# Patient Record
Sex: FEMALE | Race: WHITE | Employment: OTHER | URBAN - METROPOLITAN AREA
[De-identification: names, ages, dates, MRNs, and addresses within clinical notes are randomized per-mention and may not be internally consistent; named-entity substitution may affect disease eponyms.]

---

## 2017-01-01 ENCOUNTER — HOME CARE VISIT (OUTPATIENT)
Dept: HOSPICE | Facility: HOSPICE | Age: 82
End: 2017-01-01
Payer: MEDICARE

## 2017-01-01 ENCOUNTER — APPOINTMENT (OUTPATIENT)
Dept: CT IMAGING | Age: 82
DRG: 640 | End: 2017-01-01
Attending: EMERGENCY MEDICINE
Payer: MEDICARE

## 2017-01-01 ENCOUNTER — PATIENT OUTREACH (OUTPATIENT)
Dept: INTERNAL MEDICINE CLINIC | Age: 82
End: 2017-01-01

## 2017-01-01 ENCOUNTER — APPOINTMENT (OUTPATIENT)
Dept: GENERAL RADIOLOGY | Age: 82
DRG: 640 | End: 2017-01-01
Attending: EMERGENCY MEDICINE
Payer: MEDICARE

## 2017-01-01 ENCOUNTER — APPOINTMENT (OUTPATIENT)
Dept: CT IMAGING | Age: 82
DRG: 640 | End: 2017-01-01
Attending: HOSPITALIST
Payer: MEDICARE

## 2017-01-01 ENCOUNTER — HOSPITAL ENCOUNTER (INPATIENT)
Age: 82
LOS: 3 days | DRG: 871 | End: 2017-01-15
Attending: INTERNAL MEDICINE | Admitting: FAMILY MEDICINE
Payer: OTHER MISCELLANEOUS

## 2017-01-01 ENCOUNTER — HOSPITAL ENCOUNTER (INPATIENT)
Age: 82
LOS: 9 days | Discharge: HOSPICE | DRG: 640 | End: 2017-01-12
Attending: EMERGENCY MEDICINE | Admitting: HOSPITALIST
Payer: MEDICARE

## 2017-01-01 ENCOUNTER — APPOINTMENT (OUTPATIENT)
Dept: GENERAL RADIOLOGY | Age: 82
DRG: 640 | End: 2017-01-01
Attending: INTERNAL MEDICINE
Payer: MEDICARE

## 2017-01-01 ENCOUNTER — APPOINTMENT (OUTPATIENT)
Dept: GENERAL RADIOLOGY | Age: 82
DRG: 640 | End: 2017-01-01
Attending: HOSPITALIST
Payer: MEDICARE

## 2017-01-01 ENCOUNTER — APPOINTMENT (OUTPATIENT)
Dept: MRI IMAGING | Age: 82
DRG: 640 | End: 2017-01-01
Attending: HOSPITALIST
Payer: MEDICARE

## 2017-01-01 ENCOUNTER — HOSPICE ADMISSION (OUTPATIENT)
Dept: HOSPICE | Facility: HOSPICE | Age: 82
End: 2017-01-01
Payer: MEDICARE

## 2017-01-01 VITALS
SYSTOLIC BLOOD PRESSURE: 107 MMHG | OXYGEN SATURATION: 78 % | DIASTOLIC BLOOD PRESSURE: 70 MMHG | HEART RATE: 126 BPM | TEMPERATURE: 99.8 F | RESPIRATION RATE: 30 BRPM

## 2017-01-01 VITALS
HEART RATE: 80 BPM | WEIGHT: 123.88 LBS | OXYGEN SATURATION: 91 % | HEIGHT: 67 IN | TEMPERATURE: 98.2 F | RESPIRATION RATE: 24 BRPM | SYSTOLIC BLOOD PRESSURE: 105 MMHG | DIASTOLIC BLOOD PRESSURE: 40 MMHG | BODY MASS INDEX: 19.44 KG/M2

## 2017-01-01 DIAGNOSIS — R79.81 ELEVATED CO2 LEVEL: ICD-10-CM

## 2017-01-01 DIAGNOSIS — R41.82 ALTERED MENTAL STATUS, UNSPECIFIED ALTERED MENTAL STATUS TYPE: ICD-10-CM

## 2017-01-01 DIAGNOSIS — G93.41 METABOLIC ENCEPHALOPATHY: ICD-10-CM

## 2017-01-01 DIAGNOSIS — K11.7 INCREASED OROPHARYNGEAL SECRETIONS: ICD-10-CM

## 2017-01-01 DIAGNOSIS — R53.83 LETHARGY: Primary | ICD-10-CM

## 2017-01-01 DIAGNOSIS — R40.0 SOMNOLENCE: ICD-10-CM

## 2017-01-01 DIAGNOSIS — R06.02 SHORTNESS OF BREATH: ICD-10-CM

## 2017-01-01 DIAGNOSIS — Z71.89 GOALS OF CARE, COUNSELING/DISCUSSION: ICD-10-CM

## 2017-01-01 DIAGNOSIS — Z98.890 S/P RESECTION OF MENINGIOMA: ICD-10-CM

## 2017-01-01 DIAGNOSIS — Z86.018 S/P RESECTION OF MENINGIOMA: ICD-10-CM

## 2017-01-01 LAB
ALBUMIN SERPL BCP-MCNC: 3 G/DL (ref 3.5–5)
ALBUMIN/GLOB SERPL: 0.7 {RATIO} (ref 1.1–2.2)
ALP SERPL-CCNC: 66 U/L (ref 45–117)
ALT SERPL-CCNC: 17 U/L (ref 12–78)
AMMONIA PLAS-SCNC: <10 UMOL/L
AMORPH CRY URNS QL MICRO: ABNORMAL
ANION GAP BLD CALC-SCNC: 10 MMOL/L (ref 5–15)
ANION GAP BLD CALC-SCNC: 12 MMOL/L (ref 5–15)
ANION GAP BLD CALC-SCNC: 13 MMOL/L (ref 5–15)
ANION GAP BLD CALC-SCNC: 4 MMOL/L (ref 5–15)
ANION GAP BLD CALC-SCNC: 6 MMOL/L (ref 5–15)
ANION GAP BLD CALC-SCNC: 7 MMOL/L (ref 5–15)
ANION GAP BLD CALC-SCNC: 8 MMOL/L (ref 5–15)
ANION GAP BLD CALC-SCNC: 8 MMOL/L (ref 5–15)
ANION GAP BLD CALC-SCNC: 9 MMOL/L (ref 5–15)
APPEARANCE UR: ABNORMAL
APPEARANCE UR: CLEAR
ARTERIAL PATENCY WRIST A: YES
ARTERIAL PATENCY WRIST A: YES
AST SERPL W P-5'-P-CCNC: 14 U/L (ref 15–37)
ATRIAL RATE: 66 BPM
BACTERIA SPEC CULT: NORMAL
BACTERIA SPEC CULT: NORMAL
BACTERIA URNS QL MICRO: ABNORMAL /HPF
BACTERIA URNS QL MICRO: NEGATIVE /HPF
BASE EXCESS BLD CALC-SCNC: 3 MMOL/L
BASE EXCESS BLD CALC-SCNC: 5 MMOL/L
BASOPHILS # BLD AUTO: 0 K/UL (ref 0–0.1)
BASOPHILS # BLD: 0 % (ref 0–1)
BDY SITE: ABNORMAL
BDY SITE: ABNORMAL
BILIRUB SERPL-MCNC: 0.3 MG/DL (ref 0.2–1)
BILIRUB UR QL: NEGATIVE
BILIRUB UR QL: NEGATIVE
BUN SERPL-MCNC: 10 MG/DL (ref 6–20)
BUN SERPL-MCNC: 10 MG/DL (ref 6–20)
BUN SERPL-MCNC: 11 MG/DL (ref 6–20)
BUN SERPL-MCNC: 22 MG/DL (ref 6–20)
BUN SERPL-MCNC: 8 MG/DL (ref 6–20)
BUN SERPL-MCNC: 9 MG/DL (ref 6–20)
BUN SERPL-MCNC: 9 MG/DL (ref 6–20)
BUN/CREAT SERPL: 14 (ref 12–20)
BUN/CREAT SERPL: 16 (ref 12–20)
BUN/CREAT SERPL: 17 (ref 12–20)
BUN/CREAT SERPL: 19 (ref 12–20)
BUN/CREAT SERPL: 23 (ref 12–20)
BUN/CREAT SERPL: 24 (ref 12–20)
BUN/CREAT SERPL: 36 (ref 12–20)
CALCIUM SERPL-MCNC: 7.7 MG/DL (ref 8.5–10.1)
CALCIUM SERPL-MCNC: 7.8 MG/DL (ref 8.5–10.1)
CALCIUM SERPL-MCNC: 7.9 MG/DL (ref 8.5–10.1)
CALCIUM SERPL-MCNC: 7.9 MG/DL (ref 8.5–10.1)
CALCIUM SERPL-MCNC: 8.3 MG/DL (ref 8.5–10.1)
CALCIUM SERPL-MCNC: 8.4 MG/DL (ref 8.5–10.1)
CALCIUM SERPL-MCNC: 8.5 MG/DL (ref 8.5–10.1)
CALCIUM SERPL-MCNC: 8.5 MG/DL (ref 8.5–10.1)
CALCIUM SERPL-MCNC: 8.9 MG/DL (ref 8.5–10.1)
CALCULATED P AXIS, ECG09: 71 DEGREES
CALCULATED R AXIS, ECG10: -58 DEGREES
CALCULATED T AXIS, ECG11: 76 DEGREES
CC UR VC: NORMAL
CHLORIDE SERPL-SCNC: 101 MMOL/L (ref 97–108)
CHLORIDE SERPL-SCNC: 102 MMOL/L (ref 97–108)
CHLORIDE SERPL-SCNC: 104 MMOL/L (ref 97–108)
CHLORIDE SERPL-SCNC: 105 MMOL/L (ref 97–108)
CHLORIDE SERPL-SCNC: 107 MMOL/L (ref 97–108)
CHLORIDE SERPL-SCNC: 109 MMOL/L (ref 97–108)
CHLORIDE SERPL-SCNC: 110 MMOL/L (ref 97–108)
CHLORIDE SERPL-SCNC: 98 MMOL/L (ref 97–108)
CHLORIDE SERPL-SCNC: 98 MMOL/L (ref 97–108)
CO2 SERPL-SCNC: 24 MMOL/L (ref 21–32)
CO2 SERPL-SCNC: 26 MMOL/L (ref 21–32)
CO2 SERPL-SCNC: 26 MMOL/L (ref 21–32)
CO2 SERPL-SCNC: 27 MMOL/L (ref 21–32)
CO2 SERPL-SCNC: 28 MMOL/L (ref 21–32)
CO2 SERPL-SCNC: 29 MMOL/L (ref 21–32)
CO2 SERPL-SCNC: 29 MMOL/L (ref 21–32)
CO2 SERPL-SCNC: 30 MMOL/L (ref 21–32)
CO2 SERPL-SCNC: 35 MMOL/L (ref 21–32)
COLOR UR: ABNORMAL
COLOR UR: ABNORMAL
CREAT SERPL-MCNC: 0.46 MG/DL (ref 0.55–1.02)
CREAT SERPL-MCNC: 0.47 MG/DL (ref 0.55–1.02)
CREAT SERPL-MCNC: 0.51 MG/DL (ref 0.55–1.02)
CREAT SERPL-MCNC: 0.56 MG/DL (ref 0.55–1.02)
CREAT SERPL-MCNC: 0.58 MG/DL (ref 0.55–1.02)
CREAT SERPL-MCNC: 0.58 MG/DL (ref 0.55–1.02)
CREAT SERPL-MCNC: 0.6 MG/DL (ref 0.55–1.02)
CREAT SERPL-MCNC: 0.61 MG/DL (ref 0.55–1.02)
CREAT SERPL-MCNC: 0.71 MG/DL (ref 0.55–1.02)
DIAGNOSIS, 93000: NORMAL
EOSINOPHIL # BLD: 0 K/UL (ref 0–0.4)
EOSINOPHIL # BLD: 0.1 K/UL (ref 0–0.4)
EOSINOPHIL # BLD: 0.1 K/UL (ref 0–0.4)
EOSINOPHIL NFR BLD: 0 % (ref 0–7)
EOSINOPHIL NFR BLD: 1 % (ref 0–7)
EOSINOPHIL NFR BLD: 2 % (ref 0–7)
EPITH CASTS URNS QL MICRO: ABNORMAL /LPF
EPITH CASTS URNS QL MICRO: ABNORMAL /LPF
ERYTHROCYTE [DISTWIDTH] IN BLOOD BY AUTOMATED COUNT: 13.7 % (ref 11.5–14.5)
ERYTHROCYTE [DISTWIDTH] IN BLOOD BY AUTOMATED COUNT: 14.2 % (ref 11.5–14.5)
ERYTHROCYTE [DISTWIDTH] IN BLOOD BY AUTOMATED COUNT: 14.4 % (ref 11.5–14.5)
GAS FLOW.O2 O2 DELIVERY SYS: ABNORMAL L/MIN
GAS FLOW.O2 O2 DELIVERY SYS: ABNORMAL L/MIN
GAS FLOW.O2 SETTING OXYMISER: 3 L/M
GLOBULIN SER CALC-MCNC: 4.1 G/DL (ref 2–4)
GLUCOSE BLD STRIP.AUTO-MCNC: 103 MG/DL (ref 65–100)
GLUCOSE BLD STRIP.AUTO-MCNC: 113 MG/DL (ref 65–100)
GLUCOSE BLD STRIP.AUTO-MCNC: 120 MG/DL (ref 65–100)
GLUCOSE BLD STRIP.AUTO-MCNC: 121 MG/DL (ref 65–100)
GLUCOSE BLD STRIP.AUTO-MCNC: 123 MG/DL (ref 65–100)
GLUCOSE BLD STRIP.AUTO-MCNC: 125 MG/DL (ref 65–100)
GLUCOSE BLD STRIP.AUTO-MCNC: 140 MG/DL (ref 65–100)
GLUCOSE BLD STRIP.AUTO-MCNC: 142 MG/DL (ref 65–100)
GLUCOSE BLD STRIP.AUTO-MCNC: 152 MG/DL (ref 65–100)
GLUCOSE SERPL-MCNC: 116 MG/DL (ref 65–100)
GLUCOSE SERPL-MCNC: 122 MG/DL (ref 65–100)
GLUCOSE SERPL-MCNC: 148 MG/DL (ref 65–100)
GLUCOSE SERPL-MCNC: 166 MG/DL (ref 65–100)
GLUCOSE SERPL-MCNC: 170 MG/DL (ref 65–100)
GLUCOSE SERPL-MCNC: 86 MG/DL (ref 65–100)
GLUCOSE SERPL-MCNC: 87 MG/DL (ref 65–100)
GLUCOSE SERPL-MCNC: 89 MG/DL (ref 65–100)
GLUCOSE SERPL-MCNC: 94 MG/DL (ref 65–100)
GLUCOSE UR STRIP.AUTO-MCNC: NEGATIVE MG/DL
GLUCOSE UR STRIP.AUTO-MCNC: NEGATIVE MG/DL
HCO3 BLD-SCNC: 26 MMOL/L (ref 22–26)
HCO3 BLD-SCNC: 28.5 MMOL/L (ref 22–26)
HCT VFR BLD AUTO: 34.2 % (ref 35–47)
HCT VFR BLD AUTO: 34.6 % (ref 35–47)
HCT VFR BLD AUTO: 42.9 % (ref 35–47)
HGB BLD-MCNC: 10.9 G/DL (ref 11.5–16)
HGB BLD-MCNC: 11.2 G/DL (ref 11.5–16)
HGB BLD-MCNC: 13.8 G/DL (ref 11.5–16)
HGB UR QL STRIP: ABNORMAL
HGB UR QL STRIP: NEGATIVE
HYALINE CASTS URNS QL MICRO: ABNORMAL /LPF (ref 0–5)
KETONES UR QL STRIP.AUTO: NEGATIVE MG/DL
KETONES UR QL STRIP.AUTO: NEGATIVE MG/DL
LACTATE SERPL-SCNC: 1.7 MMOL/L (ref 0.4–2)
LEUKOCYTE ESTERASE UR QL STRIP.AUTO: ABNORMAL
LEUKOCYTE ESTERASE UR QL STRIP.AUTO: NEGATIVE
LYMPHOCYTES # BLD AUTO: 13 % (ref 12–49)
LYMPHOCYTES # BLD AUTO: 24 % (ref 12–49)
LYMPHOCYTES # BLD AUTO: 37 % (ref 12–49)
LYMPHOCYTES # BLD: 1.2 K/UL (ref 0.8–3.5)
LYMPHOCYTES # BLD: 1.6 K/UL (ref 0.8–3.5)
LYMPHOCYTES # BLD: 2.5 K/UL (ref 0.8–3.5)
MAGNESIUM SERPL-MCNC: 1.7 MG/DL (ref 1.6–2.4)
MAGNESIUM SERPL-MCNC: 1.7 MG/DL (ref 1.6–2.4)
MAGNESIUM SERPL-MCNC: 1.8 MG/DL (ref 1.6–2.4)
MAGNESIUM SERPL-MCNC: 2 MG/DL (ref 1.6–2.4)
MAGNESIUM SERPL-MCNC: 2 MG/DL (ref 1.6–2.4)
MAGNESIUM SERPL-MCNC: 2.1 MG/DL (ref 1.6–2.4)
MCH RBC QN AUTO: 27 PG (ref 26–34)
MCH RBC QN AUTO: 27 PG (ref 26–34)
MCH RBC QN AUTO: 27.5 PG (ref 26–34)
MCHC RBC AUTO-ENTMCNC: 31.9 G/DL (ref 30–36.5)
MCHC RBC AUTO-ENTMCNC: 32.2 G/DL (ref 30–36.5)
MCHC RBC AUTO-ENTMCNC: 32.4 G/DL (ref 30–36.5)
MCV RBC AUTO: 83.4 FL (ref 80–99)
MCV RBC AUTO: 84.7 FL (ref 80–99)
MCV RBC AUTO: 85.6 FL (ref 80–99)
MONOCYTES # BLD: 0.4 K/UL (ref 0–1)
MONOCYTES # BLD: 0.6 K/UL (ref 0–1)
MONOCYTES # BLD: 1.1 K/UL (ref 0–1)
MONOCYTES NFR BLD AUTO: 12 % (ref 5–13)
MONOCYTES NFR BLD AUTO: 5 % (ref 5–13)
MONOCYTES NFR BLD AUTO: 9 % (ref 5–13)
MUCOUS THREADS URNS QL MICRO: ABNORMAL /LPF
NEUTS SEG # BLD: 3.7 K/UL (ref 1.8–8)
NEUTS SEG # BLD: 4.1 K/UL (ref 1.8–8)
NEUTS SEG # BLD: 6.8 K/UL (ref 1.8–8)
NEUTS SEG NFR BLD AUTO: 57 % (ref 32–75)
NEUTS SEG NFR BLD AUTO: 65 % (ref 32–75)
NEUTS SEG NFR BLD AUTO: 75 % (ref 32–75)
NITRITE UR QL STRIP.AUTO: NEGATIVE
NITRITE UR QL STRIP.AUTO: NEGATIVE
O2/TOTAL GAS SETTING VFR VENT: 0.21 %
P-R INTERVAL, ECG05: 126 MS
PCO2 BLD: 32.7 MMHG (ref 35–45)
PCO2 BLD: 40.3 MMHG (ref 35–45)
PH BLD: 7.46 [PH] (ref 7.35–7.45)
PH BLD: 7.51 [PH] (ref 7.35–7.45)
PH UR STRIP: 6.5 [PH] (ref 5–8)
PH UR STRIP: 7.5 [PH] (ref 5–8)
PHOSPHATE SERPL-MCNC: 2.2 MG/DL (ref 2.6–4.7)
PHOSPHATE SERPL-MCNC: 3.1 MG/DL (ref 2.6–4.7)
PLATELET # BLD AUTO: 173 K/UL (ref 150–400)
PLATELET # BLD AUTO: 296 K/UL (ref 150–400)
PLATELET # BLD AUTO: 397 K/UL (ref 150–400)
PO2 BLD: 72 MMHG (ref 80–100)
PO2 BLD: 80 MMHG (ref 80–100)
POTASSIUM SERPL-SCNC: 3.4 MMOL/L (ref 3.5–5.1)
POTASSIUM SERPL-SCNC: 3.6 MMOL/L (ref 3.5–5.1)
POTASSIUM SERPL-SCNC: 3.8 MMOL/L (ref 3.5–5.1)
POTASSIUM SERPL-SCNC: 3.9 MMOL/L (ref 3.5–5.1)
POTASSIUM SERPL-SCNC: 4 MMOL/L (ref 3.5–5.1)
POTASSIUM SERPL-SCNC: 4.1 MMOL/L (ref 3.5–5.1)
POTASSIUM SERPL-SCNC: 4.1 MMOL/L (ref 3.5–5.1)
PROT SERPL-MCNC: 7.1 G/DL (ref 6.4–8.2)
PROT UR STRIP-MCNC: NEGATIVE MG/DL
PROT UR STRIP-MCNC: NEGATIVE MG/DL
Q-T INTERVAL, ECG07: 426 MS
QRS DURATION, ECG06: 90 MS
QTC CALCULATION (BEZET), ECG08: 446 MS
RBC # BLD AUTO: 4.04 M/UL (ref 3.8–5.2)
RBC # BLD AUTO: 4.15 M/UL (ref 3.8–5.2)
RBC # BLD AUTO: 5.01 M/UL (ref 3.8–5.2)
RBC #/AREA URNS HPF: ABNORMAL /HPF (ref 0–5)
RBC #/AREA URNS HPF: ABNORMAL /HPF (ref 0–5)
SAO2 % BLD: 95 % (ref 92–97)
SAO2 % BLD: 97 % (ref 92–97)
SERVICE CMNT-IMP: ABNORMAL
SERVICE CMNT-IMP: NORMAL
SODIUM SERPL-SCNC: 135 MMOL/L (ref 136–145)
SODIUM SERPL-SCNC: 136 MMOL/L (ref 136–145)
SODIUM SERPL-SCNC: 136 MMOL/L (ref 136–145)
SODIUM SERPL-SCNC: 138 MMOL/L (ref 136–145)
SODIUM SERPL-SCNC: 140 MMOL/L (ref 136–145)
SODIUM SERPL-SCNC: 142 MMOL/L (ref 136–145)
SODIUM SERPL-SCNC: 143 MMOL/L (ref 136–145)
SODIUM SERPL-SCNC: 147 MMOL/L (ref 136–145)
SODIUM SERPL-SCNC: 148 MMOL/L (ref 136–145)
SP GR UR REFRACTOMETRY: 1.02 (ref 1–1.03)
SP GR UR REFRACTOMETRY: <1.005 (ref 1–1.03)
SPECIMEN TYPE: ABNORMAL
SPECIMEN TYPE: ABNORMAL
TOTAL RESP. RATE, ITRR: 16
TOTAL RESP. RATE, ITRR: 22
TROPONIN I SERPL-MCNC: <0.04 NG/ML
TSH SERPL DL<=0.05 MIU/L-ACNC: 0.36 UIU/ML (ref 0.36–3.74)
UA: UC IF INDICATED,UAUC: ABNORMAL
UA: UC IF INDICATED,UAUC: ABNORMAL
UROBILINOGEN UR QL STRIP.AUTO: 0.2 EU/DL (ref 0.2–1)
UROBILINOGEN UR QL STRIP.AUTO: 1 EU/DL (ref 0.2–1)
VALPROATE SERPL-MCNC: 15 UG/ML (ref 50–100)
VALPROATE SERPL-MCNC: 51 UG/ML (ref 50–100)
VALPROATE SERPL-MCNC: 62 UG/ML (ref 50–100)
VENTRICULAR RATE, ECG03: 66 BPM
WBC # BLD AUTO: 6.4 K/UL (ref 3.6–11)
WBC # BLD AUTO: 6.6 K/UL (ref 3.6–11)
WBC # BLD AUTO: 9.1 K/UL (ref 3.6–11)
WBC URNS QL MICRO: ABNORMAL /HPF (ref 0–4)
WBC URNS QL MICRO: ABNORMAL /HPF (ref 0–4)

## 2017-01-01 PROCEDURE — 65660000000 HC RM CCU STEPDOWN

## 2017-01-01 PROCEDURE — 74011250636 HC RX REV CODE- 250/636: Performed by: HOSPITALIST

## 2017-01-01 PROCEDURE — 77010033678 HC OXYGEN DAILY

## 2017-01-01 PROCEDURE — 77030011943

## 2017-01-01 PROCEDURE — 65270000029 HC RM PRIVATE

## 2017-01-01 PROCEDURE — 74011000250 HC RX REV CODE- 250: Performed by: NURSE PRACTITIONER

## 2017-01-01 PROCEDURE — 74011250636 HC RX REV CODE- 250/636: Performed by: PHYSICAL MEDICINE & REHABILITATION

## 2017-01-01 PROCEDURE — 84484 ASSAY OF TROPONIN QUANT: CPT | Performed by: EMERGENCY MEDICINE

## 2017-01-01 PROCEDURE — 94640 AIRWAY INHALATION TREATMENT: CPT

## 2017-01-01 PROCEDURE — 77030029684 HC NEB SM VOL KT MONA -A

## 2017-01-01 PROCEDURE — 74011250636 HC RX REV CODE- 250/636: Performed by: FAMILY MEDICINE

## 2017-01-01 PROCEDURE — 74011250637 HC RX REV CODE- 250/637: Performed by: HOSPITALIST

## 2017-01-01 PROCEDURE — 82962 GLUCOSE BLOOD TEST: CPT

## 2017-01-01 PROCEDURE — 83605 ASSAY OF LACTIC ACID: CPT | Performed by: INTERNAL MEDICINE

## 2017-01-01 PROCEDURE — 0651 HSPC ROUTINE HOME CARE

## 2017-01-01 PROCEDURE — G8997 SWALLOW GOAL STATUS: HCPCS | Performed by: SPEECH-LANGUAGE PATHOLOGIST

## 2017-01-01 PROCEDURE — 80048 BASIC METABOLIC PNL TOTAL CA: CPT | Performed by: INTERNAL MEDICINE

## 2017-01-01 PROCEDURE — 84100 ASSAY OF PHOSPHORUS: CPT | Performed by: HOSPITALIST

## 2017-01-01 PROCEDURE — 70450 CT HEAD/BRAIN W/O DYE: CPT

## 2017-01-01 PROCEDURE — 74011250637 HC RX REV CODE- 250/637: Performed by: FAMILY MEDICINE

## 2017-01-01 PROCEDURE — 74011000250 HC RX REV CODE- 250: Performed by: FAMILY MEDICINE

## 2017-01-01 PROCEDURE — 0656 HSPC GENERAL INPATIENT

## 2017-01-01 PROCEDURE — 74011000258 HC RX REV CODE- 258: Performed by: HOSPITALIST

## 2017-01-01 PROCEDURE — 87086 URINE CULTURE/COLONY COUNT: CPT | Performed by: HOSPITALIST

## 2017-01-01 PROCEDURE — 74011000250 HC RX REV CODE- 250: Performed by: HOSPITALIST

## 2017-01-01 PROCEDURE — 80048 BASIC METABOLIC PNL TOTAL CA: CPT | Performed by: HOSPITALIST

## 2017-01-01 PROCEDURE — 83735 ASSAY OF MAGNESIUM: CPT | Performed by: HOSPITALIST

## 2017-01-01 PROCEDURE — 77030011256 HC DRSG MEPILEX <16IN NO BORD MOLN -A

## 2017-01-01 PROCEDURE — 76450000000

## 2017-01-01 PROCEDURE — 36415 COLL VENOUS BLD VENIPUNCTURE: CPT | Performed by: INTERNAL MEDICINE

## 2017-01-01 PROCEDURE — 87040 BLOOD CULTURE FOR BACTERIA: CPT | Performed by: INTERNAL MEDICINE

## 2017-01-01 PROCEDURE — 77030020291 HC FLEXSEAL FMS BMS -C

## 2017-01-01 PROCEDURE — A9585 GADOBUTROL INJECTION: HCPCS | Performed by: HOSPITALIST

## 2017-01-01 PROCEDURE — 92610 EVALUATE SWALLOWING FUNCTION: CPT | Performed by: SPEECH-LANGUAGE PATHOLOGIST

## 2017-01-01 PROCEDURE — 71010 XR CHEST PORT: CPT

## 2017-01-01 PROCEDURE — 36415 COLL VENOUS BLD VENIPUNCTURE: CPT | Performed by: HOSPITALIST

## 2017-01-01 PROCEDURE — 80164 ASSAY DIPROPYLACETIC ACD TOT: CPT | Performed by: EMERGENCY MEDICINE

## 2017-01-01 PROCEDURE — 36415 COLL VENOUS BLD VENIPUNCTURE: CPT | Performed by: EMERGENCY MEDICINE

## 2017-01-01 PROCEDURE — 74011250636 HC RX REV CODE- 250/636: Performed by: EMERGENCY MEDICINE

## 2017-01-01 PROCEDURE — 95816 EEG AWAKE AND DROWSY: CPT | Performed by: HOSPITALIST

## 2017-01-01 PROCEDURE — G8996 SWALLOW CURRENT STATUS: HCPCS | Performed by: SPEECH-LANGUAGE PATHOLOGIST

## 2017-01-01 PROCEDURE — 82140 ASSAY OF AMMONIA: CPT | Performed by: HOSPITALIST

## 2017-01-01 PROCEDURE — 84443 ASSAY THYROID STIM HORMONE: CPT | Performed by: HOSPITALIST

## 2017-01-01 PROCEDURE — 74011000258 HC RX REV CODE- 258: Performed by: INTERNAL MEDICINE

## 2017-01-01 PROCEDURE — 87186 SC STD MICRODIL/AGAR DIL: CPT | Performed by: HOSPITALIST

## 2017-01-01 PROCEDURE — 80164 ASSAY DIPROPYLACETIC ACD TOT: CPT | Performed by: HOSPITALIST

## 2017-01-01 PROCEDURE — 83735 ASSAY OF MAGNESIUM: CPT | Performed by: INTERNAL MEDICINE

## 2017-01-01 PROCEDURE — 96360 HYDRATION IV INFUSION INIT: CPT

## 2017-01-01 PROCEDURE — 87077 CULTURE AEROBIC IDENTIFY: CPT | Performed by: HOSPITALIST

## 2017-01-01 PROCEDURE — 81001 URINALYSIS AUTO W/SCOPE: CPT | Performed by: HOSPITALIST

## 2017-01-01 PROCEDURE — 85025 COMPLETE CBC W/AUTO DIFF WBC: CPT | Performed by: INTERNAL MEDICINE

## 2017-01-01 PROCEDURE — 77030021668 HC NEB PREFIL KT VYRM -A

## 2017-01-01 PROCEDURE — 74011250636 HC RX REV CODE- 250/636: Performed by: INTERNAL MEDICINE

## 2017-01-01 PROCEDURE — 82803 BLOOD GASES ANY COMBINATION: CPT

## 2017-01-01 PROCEDURE — 81001 URINALYSIS AUTO W/SCOPE: CPT | Performed by: EMERGENCY MEDICINE

## 2017-01-01 PROCEDURE — 80164 ASSAY DIPROPYLACETIC ACD TOT: CPT | Performed by: PSYCHIATRY & NEUROLOGY

## 2017-01-01 PROCEDURE — 92526 ORAL FUNCTION THERAPY: CPT | Performed by: SPEECH-LANGUAGE PATHOLOGIST

## 2017-01-01 PROCEDURE — 36600 WITHDRAWAL OF ARTERIAL BLOOD: CPT

## 2017-01-01 PROCEDURE — 3336500001 HSPC ELECTION

## 2017-01-01 PROCEDURE — 70553 MRI BRAIN STEM W/O & W/DYE: CPT

## 2017-01-01 PROCEDURE — 77030032490 HC SLV COMPR SCD KNE COVD -B

## 2017-01-01 PROCEDURE — G0299 HHS/HOSPICE OF RN EA 15 MIN: HCPCS

## 2017-01-01 PROCEDURE — L4396 STATIC OR DYNAMI AFO PRE CST: HCPCS

## 2017-01-01 PROCEDURE — 74011250636 HC RX REV CODE- 250/636

## 2017-01-01 PROCEDURE — 85025 COMPLETE CBC W/AUTO DIFF WBC: CPT | Performed by: EMERGENCY MEDICINE

## 2017-01-01 PROCEDURE — 99285 EMERGENCY DEPT VISIT HI MDM: CPT

## 2017-01-01 PROCEDURE — 74011250636 HC RX REV CODE- 250/636: Performed by: NURSE PRACTITIONER

## 2017-01-01 PROCEDURE — 93005 ELECTROCARDIOGRAM TRACING: CPT

## 2017-01-01 PROCEDURE — 80053 COMPREHEN METABOLIC PANEL: CPT | Performed by: EMERGENCY MEDICINE

## 2017-01-01 PROCEDURE — 85025 COMPLETE CBC W/AUTO DIFF WBC: CPT | Performed by: HOSPITALIST

## 2017-01-01 PROCEDURE — 74011000250 HC RX REV CODE- 250: Performed by: INTERNAL MEDICINE

## 2017-01-01 RX ORDER — ASPIRIN 81 MG/1
81 TABLET ORAL DAILY
Status: DISCONTINUED | OUTPATIENT
Start: 2017-01-01 | End: 2017-01-01

## 2017-01-01 RX ORDER — IPRATROPIUM BROMIDE AND ALBUTEROL SULFATE 2.5; .5 MG/3ML; MG/3ML
3 SOLUTION RESPIRATORY (INHALATION)
Status: DISCONTINUED | OUTPATIENT
Start: 2017-01-01 | End: 2017-01-01

## 2017-01-01 RX ORDER — SCOLOPAMINE TRANSDERMAL SYSTEM 1 MG/1
1.5 PATCH, EXTENDED RELEASE TRANSDERMAL
Status: CANCELLED | OUTPATIENT
Start: 2017-01-01

## 2017-01-01 RX ORDER — GUAIFENESIN 400 MG/1
400 TABLET ORAL
COMMUNITY

## 2017-01-01 RX ORDER — FACIAL-BODY WIPES
10 EACH TOPICAL DAILY PRN
Status: DISCONTINUED | OUTPATIENT
Start: 2017-01-01 | End: 2017-01-01 | Stop reason: HOSPADM

## 2017-01-01 RX ORDER — MORPHINE SULFATE 2 MG/ML
2 INJECTION, SOLUTION INTRAMUSCULAR; INTRAVENOUS
Status: DISCONTINUED | OUTPATIENT
Start: 2017-01-01 | End: 2017-01-01 | Stop reason: HOSPADM

## 2017-01-01 RX ORDER — ACETYLCYSTEINE 200 MG/ML
200 SOLUTION ORAL; RESPIRATORY (INHALATION)
Status: DISCONTINUED | OUTPATIENT
Start: 2017-01-01 | End: 2017-01-01

## 2017-01-01 RX ORDER — IPRATROPIUM BROMIDE AND ALBUTEROL SULFATE 2.5; .5 MG/3ML; MG/3ML
3 SOLUTION RESPIRATORY (INHALATION)
Status: CANCELLED | OUTPATIENT
Start: 2017-01-01

## 2017-01-01 RX ORDER — HYDRALAZINE HYDROCHLORIDE 20 MG/ML
10 INJECTION INTRAMUSCULAR; INTRAVENOUS
Status: CANCELLED | OUTPATIENT
Start: 2017-01-01

## 2017-01-01 RX ORDER — LORAZEPAM 2 MG/ML
1 INJECTION INTRAMUSCULAR EVERY 4 HOURS
Status: DISCONTINUED | OUTPATIENT
Start: 2017-01-01 | End: 2017-01-01 | Stop reason: HOSPADM

## 2017-01-01 RX ORDER — HALOPERIDOL 1 MG/1
2 TABLET ORAL
Status: DISCONTINUED | OUTPATIENT
Start: 2017-01-01 | End: 2017-01-01

## 2017-01-01 RX ORDER — MORPHINE SULFATE 2 MG/ML
2 INJECTION, SOLUTION INTRAMUSCULAR; INTRAVENOUS
Status: DISCONTINUED | OUTPATIENT
Start: 2017-01-01 | End: 2017-01-01

## 2017-01-01 RX ORDER — LORAZEPAM 2 MG/ML
1 INJECTION INTRAMUSCULAR
Status: DISCONTINUED | OUTPATIENT
Start: 2017-01-01 | End: 2017-01-01

## 2017-01-01 RX ORDER — ENOXAPARIN SODIUM 100 MG/ML
40 INJECTION SUBCUTANEOUS EVERY 24 HOURS
Status: DISCONTINUED | OUTPATIENT
Start: 2017-01-01 | End: 2017-01-01

## 2017-01-01 RX ORDER — SCOLOPAMINE TRANSDERMAL SYSTEM 1 MG/1
1.5 PATCH, EXTENDED RELEASE TRANSDERMAL
Status: DISCONTINUED | OUTPATIENT
Start: 2017-01-01 | End: 2017-01-01

## 2017-01-01 RX ORDER — ACETAMINOPHEN 650 MG/1
650 SUPPOSITORY RECTAL
Status: CANCELLED | OUTPATIENT
Start: 2017-01-01

## 2017-01-01 RX ORDER — LORAZEPAM 2 MG/ML
1 INJECTION INTRAMUSCULAR
Status: DISCONTINUED | OUTPATIENT
Start: 2017-01-01 | End: 2017-01-01 | Stop reason: HOSPADM

## 2017-01-01 RX ORDER — ASPIRIN 81 MG/1
81 TABLET ORAL DAILY
Status: CANCELLED | OUTPATIENT
Start: 2017-01-01

## 2017-01-01 RX ORDER — MORPHINE SULFATE 2 MG/ML
2 INJECTION, SOLUTION INTRAMUSCULAR; INTRAVENOUS EVERY 4 HOURS
Status: DISCONTINUED | OUTPATIENT
Start: 2017-01-01 | End: 2017-01-01

## 2017-01-01 RX ORDER — ATROPINE SULFATE 10 MG/ML
1 SOLUTION/ DROPS OPHTHALMIC 3 TIMES DAILY
Status: DISCONTINUED | OUTPATIENT
Start: 2017-01-01 | End: 2017-01-01 | Stop reason: HOSPADM

## 2017-01-01 RX ORDER — VALPROIC ACID 250 MG/5ML
250 SOLUTION ORAL 3 TIMES DAILY
Status: DISCONTINUED | OUTPATIENT
Start: 2017-01-01 | End: 2017-01-01

## 2017-01-01 RX ORDER — ACETAMINOPHEN 650 MG/1
650 SUPPOSITORY RECTAL
Status: DISCONTINUED | OUTPATIENT
Start: 2017-01-01 | End: 2017-01-01 | Stop reason: HOSPADM

## 2017-01-01 RX ORDER — POTASSIUM CHLORIDE 20MEQ/15ML
20 LIQUID (ML) ORAL DAILY
Status: DISCONTINUED | OUTPATIENT
Start: 2017-01-01 | End: 2017-01-01

## 2017-01-01 RX ORDER — MELATONIN
1000 DAILY
COMMUNITY

## 2017-01-01 RX ORDER — GLYCOPYRROLATE 0.2 MG/ML
0.2 INJECTION INTRAMUSCULAR; INTRAVENOUS
Status: DISCONTINUED | OUTPATIENT
Start: 2017-01-01 | End: 2017-01-01 | Stop reason: HOSPADM

## 2017-01-01 RX ORDER — ASPIRIN 81 MG/1
81 TABLET ORAL DAILY
COMMUNITY

## 2017-01-01 RX ORDER — GLYCOPYRROLATE 0.2 MG/ML
0.2 INJECTION INTRAMUSCULAR; INTRAVENOUS
Status: DISCONTINUED | OUTPATIENT
Start: 2017-01-01 | End: 2017-01-01

## 2017-01-01 RX ORDER — HALOPERIDOL 5 MG/ML
2 INJECTION INTRAMUSCULAR
Status: DISCONTINUED | OUTPATIENT
Start: 2017-01-01 | End: 2017-01-01 | Stop reason: HOSPADM

## 2017-01-01 RX ORDER — DILTIAZEM HYDROCHLORIDE 5 MG/ML
5 INJECTION INTRAVENOUS ONCE
Status: COMPLETED | OUTPATIENT
Start: 2017-01-01 | End: 2017-01-01

## 2017-01-01 RX ORDER — ACETAMINOPHEN 650 MG/1
650 SUPPOSITORY RECTAL
Status: DISCONTINUED | OUTPATIENT
Start: 2017-01-01 | End: 2017-01-01 | Stop reason: SDUPTHER

## 2017-01-01 RX ORDER — LANOLIN ALCOHOL/MO/W.PET/CERES
400 CREAM (GRAM) TOPICAL DAILY
Status: DISCONTINUED | OUTPATIENT
Start: 2017-01-01 | End: 2017-01-01

## 2017-01-01 RX ORDER — ACETYLCYSTEINE 200 MG/ML
200 SOLUTION ORAL; RESPIRATORY (INHALATION)
Status: DISCONTINUED | OUTPATIENT
Start: 2017-01-01 | End: 2017-01-01 | Stop reason: HOSPADM

## 2017-01-01 RX ORDER — ACETAMINOPHEN 500 MG
500 TABLET ORAL
Status: CANCELLED | OUTPATIENT
Start: 2017-01-01

## 2017-01-01 RX ORDER — MENTHOL AND ZINC OXIDE .44; 20.625 G/100G; G/100G
OINTMENT TOPICAL 3 TIMES DAILY
COMMUNITY

## 2017-01-01 RX ORDER — IPRATROPIUM BROMIDE AND ALBUTEROL SULFATE 2.5; .5 MG/3ML; MG/3ML
3 SOLUTION RESPIRATORY (INHALATION)
Status: DISCONTINUED | OUTPATIENT
Start: 2017-01-01 | End: 2017-01-01 | Stop reason: HOSPADM

## 2017-01-01 RX ORDER — LEVOTHYROXINE SODIUM 125 UG/1
125 TABLET ORAL
COMMUNITY

## 2017-01-01 RX ORDER — FUROSEMIDE 10 MG/ML
20 INJECTION INTRAMUSCULAR; INTRAVENOUS ONCE
Status: COMPLETED | OUTPATIENT
Start: 2017-01-01 | End: 2017-01-01

## 2017-01-01 RX ORDER — SODIUM CHLORIDE 0.9 % (FLUSH) 0.9 %
10 SYRINGE (ML) INJECTION
Status: COMPLETED | OUTPATIENT
Start: 2017-01-01 | End: 2017-01-01

## 2017-01-01 RX ORDER — ACETYLCYSTEINE 200 MG/ML
200 SOLUTION ORAL; RESPIRATORY (INHALATION)
Status: CANCELLED | OUTPATIENT
Start: 2017-01-01

## 2017-01-01 RX ORDER — MORPHINE SULFATE 2 MG/ML
2 INJECTION, SOLUTION INTRAMUSCULAR; INTRAVENOUS
Status: CANCELLED | OUTPATIENT
Start: 2017-01-01

## 2017-01-01 RX ORDER — LEVOTHYROXINE SODIUM 125 UG/1
125 TABLET ORAL
Status: DISCONTINUED | OUTPATIENT
Start: 2017-01-01 | End: 2017-01-01

## 2017-01-01 RX ORDER — ATORVASTATIN CALCIUM 10 MG/1
10 TABLET, FILM COATED ORAL DAILY
Status: DISCONTINUED | OUTPATIENT
Start: 2017-01-01 | End: 2017-01-01

## 2017-01-01 RX ORDER — SODIUM CHLORIDE 0.9 % (FLUSH) 0.9 %
5-10 SYRINGE (ML) INJECTION AS NEEDED
Status: DISCONTINUED | OUTPATIENT
Start: 2017-01-01 | End: 2017-01-01

## 2017-01-01 RX ORDER — HALOPERIDOL 5 MG/ML
3 INJECTION INTRAMUSCULAR
Status: DISCONTINUED | OUTPATIENT
Start: 2017-01-01 | End: 2017-01-01

## 2017-01-01 RX ORDER — SODIUM CHLORIDE 9 MG/ML
75 INJECTION, SOLUTION INTRAVENOUS CONTINUOUS
Status: DISCONTINUED | OUTPATIENT
Start: 2017-01-01 | End: 2017-01-01

## 2017-01-01 RX ORDER — GLYCOPYRROLATE 0.2 MG/ML
0.2 INJECTION INTRAMUSCULAR; INTRAVENOUS
Status: CANCELLED | OUTPATIENT
Start: 2017-01-01

## 2017-01-01 RX ORDER — ACETAMINOPHEN 500 MG
500 TABLET ORAL
Status: DISCONTINUED | OUTPATIENT
Start: 2017-01-01 | End: 2017-01-01 | Stop reason: HOSPADM

## 2017-01-01 RX ORDER — SODIUM CHLORIDE 0.9 % (FLUSH) 0.9 %
5-10 SYRINGE (ML) INJECTION EVERY 8 HOURS
Status: DISCONTINUED | OUTPATIENT
Start: 2017-01-01 | End: 2017-01-01

## 2017-01-01 RX ORDER — ACETAMINOPHEN 500 MG
500 TABLET ORAL
Status: DISCONTINUED | OUTPATIENT
Start: 2017-01-01 | End: 2017-01-01

## 2017-01-01 RX ORDER — KETOROLAC TROMETHAMINE 30 MG/ML
15 INJECTION, SOLUTION INTRAMUSCULAR; INTRAVENOUS
Status: DISCONTINUED | OUTPATIENT
Start: 2017-01-01 | End: 2017-01-01 | Stop reason: HOSPADM

## 2017-01-01 RX ORDER — HALOPERIDOL 5 MG/ML
3 INJECTION INTRAMUSCULAR
Status: DISCONTINUED | OUTPATIENT
Start: 2017-01-01 | End: 2017-01-01 | Stop reason: HOSPADM

## 2017-01-01 RX ORDER — GLYCOPYRROLATE 0.2 MG/ML
0.2 INJECTION INTRAMUSCULAR; INTRAVENOUS EVERY 4 HOURS
Status: DISCONTINUED | OUTPATIENT
Start: 2017-01-01 | End: 2017-01-01 | Stop reason: HOSPADM

## 2017-01-01 RX ORDER — ACETAMINOPHEN 500 MG
500 TABLET ORAL
COMMUNITY

## 2017-01-01 RX ORDER — ATROPINE SULFATE 10 MG/ML
1 SOLUTION/ DROPS OPHTHALMIC 3 TIMES DAILY
Status: CANCELLED | OUTPATIENT
Start: 2017-01-01

## 2017-01-01 RX ORDER — ASPIRIN 81 MG/1
81 TABLET ORAL DAILY
Status: DISCONTINUED | OUTPATIENT
Start: 2017-01-01 | End: 2017-01-01 | Stop reason: HOSPADM

## 2017-01-01 RX ORDER — SODIUM CHLORIDE 450 MG/100ML
100 INJECTION, SOLUTION INTRAVENOUS CONTINUOUS
Status: DISCONTINUED | OUTPATIENT
Start: 2017-01-01 | End: 2017-01-01

## 2017-01-01 RX ORDER — KETOROLAC TROMETHAMINE 30 MG/ML
15 INJECTION, SOLUTION INTRAMUSCULAR; INTRAVENOUS
Status: DISCONTINUED | OUTPATIENT
Start: 2017-01-01 | End: 2017-01-01

## 2017-01-01 RX ORDER — SCOLOPAMINE TRANSDERMAL SYSTEM 1 MG/1
1.5 PATCH, EXTENDED RELEASE TRANSDERMAL
Status: DISCONTINUED | OUTPATIENT
Start: 2017-01-01 | End: 2017-01-01 | Stop reason: HOSPADM

## 2017-01-01 RX ORDER — HYDRALAZINE HYDROCHLORIDE 20 MG/ML
10 INJECTION INTRAMUSCULAR; INTRAVENOUS
Status: DISCONTINUED | OUTPATIENT
Start: 2017-01-01 | End: 2017-01-01 | Stop reason: HOSPADM

## 2017-01-01 RX ORDER — DEXTROSE MONOHYDRATE 50 MG/ML
75 INJECTION, SOLUTION INTRAVENOUS CONTINUOUS
Status: DISCONTINUED | OUTPATIENT
Start: 2017-01-01 | End: 2017-01-01

## 2017-01-01 RX ORDER — SODIUM CHLORIDE 0.9 % (FLUSH) 0.9 %
5-10 SYRINGE (ML) INJECTION AS NEEDED
Status: CANCELLED | OUTPATIENT
Start: 2017-01-01

## 2017-01-01 RX ORDER — SODIUM CHLORIDE 0.9 % (FLUSH) 0.9 %
SYRINGE (ML) INJECTION
Status: COMPLETED
Start: 2017-01-01 | End: 2017-01-01

## 2017-01-01 RX ORDER — HALOPERIDOL 5 MG/ML
3 INJECTION INTRAMUSCULAR
Status: CANCELLED | OUTPATIENT
Start: 2017-01-01

## 2017-01-01 RX ORDER — LORAZEPAM 2 MG/ML
1 INJECTION INTRAMUSCULAR
Status: CANCELLED | OUTPATIENT
Start: 2017-01-01

## 2017-01-01 RX ORDER — KETOROLAC TROMETHAMINE 30 MG/ML
15 INJECTION, SOLUTION INTRAMUSCULAR; INTRAVENOUS
Status: DISCONTINUED | OUTPATIENT
Start: 2017-01-01 | End: 2017-01-01 | Stop reason: SDUPTHER

## 2017-01-01 RX ORDER — MELATONIN
1000 DAILY
Status: DISCONTINUED | OUTPATIENT
Start: 2017-01-01 | End: 2017-01-01

## 2017-01-01 RX ORDER — POTASSIUM CHLORIDE AND SODIUM CHLORIDE 900; 300 MG/100ML; MG/100ML
INJECTION, SOLUTION INTRAVENOUS CONTINUOUS
Status: DISCONTINUED | OUTPATIENT
Start: 2017-01-01 | End: 2017-01-01

## 2017-01-01 RX ORDER — GLYCOPYRROLATE 0.2 MG/ML
0.2 INJECTION INTRAMUSCULAR; INTRAVENOUS
Status: DISCONTINUED | OUTPATIENT
Start: 2017-01-01 | End: 2017-01-01 | Stop reason: SDUPTHER

## 2017-01-01 RX ORDER — SODIUM CHLORIDE 0.9 % (FLUSH) 0.9 %
5-10 SYRINGE (ML) INJECTION AS NEEDED
Status: DISCONTINUED | OUTPATIENT
Start: 2017-01-01 | End: 2017-01-01 | Stop reason: HOSPADM

## 2017-01-01 RX ADMIN — Medication 10 ML: at 06:24

## 2017-01-01 RX ADMIN — Medication 2 MG: at 09:33

## 2017-01-01 RX ADMIN — GLYCOPYRROLATE 0.2 MG: 0.2 INJECTION, SOLUTION INTRAMUSCULAR; INTRAVENOUS at 06:53

## 2017-01-01 RX ADMIN — Medication 2 MG: at 23:55

## 2017-01-01 RX ADMIN — ENOXAPARIN SODIUM 40 MG: 40 INJECTION SUBCUTANEOUS at 20:28

## 2017-01-01 RX ADMIN — POTASSIUM CHLORIDE 20 MEQ: 40 SOLUTION ORAL at 09:56

## 2017-01-01 RX ADMIN — ENOXAPARIN SODIUM 40 MG: 40 INJECTION SUBCUTANEOUS at 22:15

## 2017-01-01 RX ADMIN — VALPROIC ACID 250 MG: 250 SOLUTION ORAL at 08:45

## 2017-01-01 RX ADMIN — GLYCOPYRROLATE 0.2 MG: 0.2 INJECTION, SOLUTION INTRAMUSCULAR; INTRAVENOUS at 18:01

## 2017-01-01 RX ADMIN — DEXTROSE MONOHYDRATE 75 ML/HR: 5 INJECTION, SOLUTION INTRAVENOUS at 13:16

## 2017-01-01 RX ADMIN — VALPROIC ACID 250 MG: 250 SOLUTION ORAL at 09:20

## 2017-01-01 RX ADMIN — ASPIRIN 81 MG: 81 TABLET, COATED ORAL at 08:44

## 2017-01-01 RX ADMIN — VITAMIN D, TAB 1000IU (100/BT) 1000 UNITS: 25 TAB at 09:54

## 2017-01-01 RX ADMIN — DEXTROSE MONOHYDRATE 75 ML/HR: 5 INJECTION, SOLUTION INTRAVENOUS at 15:26

## 2017-01-01 RX ADMIN — Medication 2 MG: at 18:01

## 2017-01-01 RX ADMIN — GLYCOPYRROLATE 0.2 MG: 0.2 INJECTION, SOLUTION INTRAMUSCULAR; INTRAVENOUS at 22:27

## 2017-01-01 RX ADMIN — SODIUM CHLORIDE 1000 ML: 900 INJECTION, SOLUTION INTRAVENOUS at 15:43

## 2017-01-01 RX ADMIN — VALPROIC ACID 250 MG: 250 SOLUTION ORAL at 22:27

## 2017-01-01 RX ADMIN — Medication 2 MG: at 09:50

## 2017-01-01 RX ADMIN — GLYCOPYRROLATE 0.2 MG: 0.2 INJECTION, SOLUTION INTRAMUSCULAR; INTRAVENOUS at 19:04

## 2017-01-01 RX ADMIN — SODIUM CHLORIDE 100 ML/HR: 450 INJECTION, SOLUTION INTRAVENOUS at 06:31

## 2017-01-01 RX ADMIN — Medication 10 ML: at 14:51

## 2017-01-01 RX ADMIN — ENOXAPARIN SODIUM 40 MG: 40 INJECTION SUBCUTANEOUS at 20:44

## 2017-01-01 RX ADMIN — PIPERACILLIN SODIUM,TAZOBACTAM SODIUM 3.38 G: 3; .375 INJECTION, POWDER, FOR SOLUTION INTRAVENOUS at 13:22

## 2017-01-01 RX ADMIN — GLYCOPYRROLATE 0.2 MG: 0.2 INJECTION, SOLUTION INTRAMUSCULAR; INTRAVENOUS at 15:16

## 2017-01-01 RX ADMIN — GLYCOPYRROLATE 0.2 MG: 0.2 INJECTION, SOLUTION INTRAMUSCULAR; INTRAVENOUS at 03:30

## 2017-01-01 RX ADMIN — ENOXAPARIN SODIUM 40 MG: 40 INJECTION SUBCUTANEOUS at 20:41

## 2017-01-01 RX ADMIN — SODIUM CHLORIDE 100 ML/HR: 450 INJECTION, SOLUTION INTRAVENOUS at 20:46

## 2017-01-01 RX ADMIN — IPRATROPIUM BROMIDE AND ALBUTEROL SULFATE 3 ML: .5; 3 SOLUTION RESPIRATORY (INHALATION) at 00:30

## 2017-01-01 RX ADMIN — GLYCOPYRROLATE 0.2 MG: 0.2 INJECTION, SOLUTION INTRAMUSCULAR; INTRAVENOUS at 22:58

## 2017-01-01 RX ADMIN — Medication 10 ML: at 06:17

## 2017-01-01 RX ADMIN — Medication 10 ML: at 22:32

## 2017-01-01 RX ADMIN — Medication 10 ML: at 22:41

## 2017-01-01 RX ADMIN — Medication 10 ML: at 21:27

## 2017-01-01 RX ADMIN — HYDRALAZINE HYDROCHLORIDE 10 MG: 20 INJECTION INTRAMUSCULAR; INTRAVENOUS at 04:50

## 2017-01-01 RX ADMIN — Medication 2 MG: at 15:16

## 2017-01-01 RX ADMIN — DEXTROSE MONOHYDRATE 75 ML/HR: 5 INJECTION, SOLUTION INTRAVENOUS at 09:37

## 2017-01-01 RX ADMIN — ATROPINE SULFATE 1 DROP: 10 SOLUTION/ DROPS OPHTHALMIC at 14:59

## 2017-01-01 RX ADMIN — LORAZEPAM 1 MG: 2 INJECTION INTRAMUSCULAR; INTRAVENOUS at 15:58

## 2017-01-01 RX ADMIN — Medication 2 MG: at 19:04

## 2017-01-01 RX ADMIN — ASPIRIN 81 MG: 81 TABLET, COATED ORAL at 09:58

## 2017-01-01 RX ADMIN — Medication 10 ML: at 13:16

## 2017-01-01 RX ADMIN — DEXTROSE MONOHYDRATE 75 ML/HR: 5 INJECTION, SOLUTION INTRAVENOUS at 23:03

## 2017-01-01 RX ADMIN — Medication 10 ML: at 19:40

## 2017-01-01 RX ADMIN — GLYCOPYRROLATE 0.2 MG: 0.2 INJECTION, SOLUTION INTRAMUSCULAR; INTRAVENOUS at 06:49

## 2017-01-01 RX ADMIN — LORAZEPAM 1 MG: 2 INJECTION INTRAMUSCULAR; INTRAVENOUS at 15:49

## 2017-01-01 RX ADMIN — ATROPINE SULFATE 1 DROP: 10 SOLUTION/ DROPS OPHTHALMIC at 09:33

## 2017-01-01 RX ADMIN — DEXTROSE MONOHYDRATE 75 ML/HR: 5 INJECTION, SOLUTION INTRAVENOUS at 11:35

## 2017-01-01 RX ADMIN — GLYCOPYRROLATE 0.2 MG: 0.2 INJECTION, SOLUTION INTRAMUSCULAR; INTRAVENOUS at 14:59

## 2017-01-01 RX ADMIN — HYDRALAZINE HYDROCHLORIDE 10 MG: 20 INJECTION INTRAMUSCULAR; INTRAVENOUS at 03:18

## 2017-01-01 RX ADMIN — Medication 2 MG: at 06:01

## 2017-01-01 RX ADMIN — ATROPINE SULFATE 1 DROP: 10 SOLUTION/ DROPS OPHTHALMIC at 21:00

## 2017-01-01 RX ADMIN — VALPROIC ACID 250 MG: 250 SOLUTION ORAL at 00:44

## 2017-01-01 RX ADMIN — Medication 2 MG: at 12:12

## 2017-01-01 RX ADMIN — PIPERACILLIN SODIUM,TAZOBACTAM SODIUM 3.38 G: 3; .375 INJECTION, POWDER, FOR SOLUTION INTRAVENOUS at 06:23

## 2017-01-01 RX ADMIN — ACETYLCYSTEINE 200 MG: 200 SOLUTION ORAL; RESPIRATORY (INHALATION) at 08:40

## 2017-01-01 RX ADMIN — POTASSIUM CHLORIDE 20 MEQ: 40 SOLUTION ORAL at 09:59

## 2017-01-01 RX ADMIN — IPRATROPIUM BROMIDE AND ALBUTEROL SULFATE 3 ML: .5; 3 SOLUTION RESPIRATORY (INHALATION) at 13:42

## 2017-01-01 RX ADMIN — VITAMIN D, TAB 1000IU (100/BT) 1000 UNITS: 25 TAB at 09:58

## 2017-01-01 RX ADMIN — Medication 400 MG: at 12:35

## 2017-01-01 RX ADMIN — DILTIAZEM HYDROCHLORIDE 5 MG: 5 INJECTION INTRAVENOUS at 23:42

## 2017-01-01 RX ADMIN — IPRATROPIUM BROMIDE AND ALBUTEROL SULFATE 3 ML: .5; 3 SOLUTION RESPIRATORY (INHALATION) at 13:24

## 2017-01-01 RX ADMIN — ATORVASTATIN CALCIUM 10 MG: 10 TABLET, FILM COATED ORAL at 09:20

## 2017-01-01 RX ADMIN — LEVOTHYROXINE SODIUM 125 MCG: 125 TABLET ORAL at 07:32

## 2017-01-01 RX ADMIN — POTASSIUM CHLORIDE AND SODIUM CHLORIDE: 900; 300 INJECTION, SOLUTION INTRAVENOUS at 08:40

## 2017-01-01 RX ADMIN — Medication 10 ML: at 23:14

## 2017-01-01 RX ADMIN — Medication 2 MG: at 14:03

## 2017-01-01 RX ADMIN — DEXTROSE MONOHYDRATE 75 ML/HR: 5 INJECTION, SOLUTION INTRAVENOUS at 01:11

## 2017-01-01 RX ADMIN — ENOXAPARIN SODIUM 40 MG: 40 INJECTION SUBCUTANEOUS at 20:24

## 2017-01-01 RX ADMIN — VALPROIC ACID 250 MG: 250 SOLUTION ORAL at 16:47

## 2017-01-01 RX ADMIN — ATORVASTATIN CALCIUM 10 MG: 10 TABLET, FILM COATED ORAL at 09:50

## 2017-01-01 RX ADMIN — DEXTROSE MONOHYDRATE 75 ML/HR: 5 INJECTION, SOLUTION INTRAVENOUS at 14:58

## 2017-01-01 RX ADMIN — Medication 2 MG: at 08:08

## 2017-01-01 RX ADMIN — Medication 2 MG: at 12:20

## 2017-01-01 RX ADMIN — VITAMIN D, TAB 1000IU (100/BT) 1000 UNITS: 25 TAB at 08:44

## 2017-01-01 RX ADMIN — VALPROIC ACID 250 MG: 250 SOLUTION ORAL at 01:07

## 2017-01-01 RX ADMIN — Medication 10 ML: at 16:28

## 2017-01-01 RX ADMIN — GLYCOPYRROLATE 0.2 MG: 0.2 INJECTION, SOLUTION INTRAMUSCULAR; INTRAVENOUS at 03:06

## 2017-01-01 RX ADMIN — ACETAMINOPHEN 650 MG: 650 SUPPOSITORY RECTAL at 20:49

## 2017-01-01 RX ADMIN — VITAMIN D, TAB 1000IU (100/BT) 1000 UNITS: 25 TAB at 09:20

## 2017-01-01 RX ADMIN — Medication 10 ML: at 14:58

## 2017-01-01 RX ADMIN — GLYCOPYRROLATE 0.2 MG: 0.2 INJECTION INTRAMUSCULAR; INTRAVENOUS at 00:39

## 2017-01-01 RX ADMIN — POTASSIUM CHLORIDE 20 MEQ: 40 SOLUTION ORAL at 08:44

## 2017-01-01 RX ADMIN — LORAZEPAM 1 MG: 2 INJECTION INTRAMUSCULAR; INTRAVENOUS at 20:45

## 2017-01-01 RX ADMIN — IPRATROPIUM BROMIDE AND ALBUTEROL SULFATE 3 ML: .5; 3 SOLUTION RESPIRATORY (INHALATION) at 18:17

## 2017-01-01 RX ADMIN — ENOXAPARIN SODIUM 40 MG: 40 INJECTION SUBCUTANEOUS at 19:39

## 2017-01-01 RX ADMIN — Medication 400 MG: at 09:54

## 2017-01-01 RX ADMIN — Medication 10 ML: at 07:01

## 2017-01-01 RX ADMIN — Medication 10 ML: at 16:29

## 2017-01-01 RX ADMIN — ATORVASTATIN CALCIUM 10 MG: 10 TABLET, FILM COATED ORAL at 08:44

## 2017-01-01 RX ADMIN — VALPROIC ACID 250 MG: 250 SOLUTION ORAL at 21:22

## 2017-01-01 RX ADMIN — Medication 10 ML: at 08:47

## 2017-01-01 RX ADMIN — SODIUM CHLORIDE 75 ML/HR: 900 INJECTION, SOLUTION INTRAVENOUS at 01:22

## 2017-01-01 RX ADMIN — GLYCOPYRROLATE 0.2 MG: 0.2 INJECTION, SOLUTION INTRAMUSCULAR; INTRAVENOUS at 11:38

## 2017-01-01 RX ADMIN — LORAZEPAM 1 MG: 2 INJECTION INTRAMUSCULAR; INTRAVENOUS at 09:08

## 2017-01-01 RX ADMIN — ENOXAPARIN SODIUM 40 MG: 40 INJECTION SUBCUTANEOUS at 21:00

## 2017-01-01 RX ADMIN — GLYCOPYRROLATE 0.2 MG: 0.2 INJECTION, SOLUTION INTRAMUSCULAR; INTRAVENOUS at 10:54

## 2017-01-01 RX ADMIN — Medication 2 MG: at 06:58

## 2017-01-01 RX ADMIN — LEVOTHYROXINE SODIUM 125 MCG: 125 TABLET ORAL at 08:38

## 2017-01-01 RX ADMIN — PIPERACILLIN SODIUM,TAZOBACTAM SODIUM 3.38 G: 3; .375 INJECTION, POWDER, FOR SOLUTION INTRAVENOUS at 02:03

## 2017-01-01 RX ADMIN — ATROPINE SULFATE 1 DROP: 10 SOLUTION/ DROPS OPHTHALMIC at 09:40

## 2017-01-01 RX ADMIN — HYDRALAZINE HYDROCHLORIDE 10 MG: 20 INJECTION INTRAMUSCULAR; INTRAVENOUS at 01:40

## 2017-01-01 RX ADMIN — GLYCOPYRROLATE 0.2 MG: 0.2 INJECTION, SOLUTION INTRAMUSCULAR; INTRAVENOUS at 02:55

## 2017-01-01 RX ADMIN — Medication 10 ML: at 07:12

## 2017-01-01 RX ADMIN — Medication 2 MG: at 14:37

## 2017-01-01 RX ADMIN — ATROPINE SULFATE 1 DROP: 10 SOLUTION/ DROPS OPHTHALMIC at 14:27

## 2017-01-01 RX ADMIN — FUROSEMIDE 20 MG: 10 INJECTION, SOLUTION INTRAMUSCULAR; INTRAVENOUS at 09:58

## 2017-01-01 RX ADMIN — Medication 10 ML: at 14:20

## 2017-01-01 RX ADMIN — ACETAMINOPHEN 500 MG: 500 TABLET ORAL at 16:10

## 2017-01-01 RX ADMIN — GLYCOPYRROLATE 0.2 MG: 0.2 INJECTION, SOLUTION INTRAMUSCULAR; INTRAVENOUS at 06:58

## 2017-01-01 RX ADMIN — Medication 10 ML: at 20:47

## 2017-01-01 RX ADMIN — DEXTROSE MONOHYDRATE 75 ML/HR: 5 INJECTION, SOLUTION INTRAVENOUS at 06:43

## 2017-01-01 RX ADMIN — ATROPINE SULFATE 1 DROP: 10 SOLUTION/ DROPS OPHTHALMIC at 15:49

## 2017-01-01 RX ADMIN — Medication 10 ML: at 14:10

## 2017-01-01 RX ADMIN — LORAZEPAM 1 MG: 2 INJECTION INTRAMUSCULAR; INTRAVENOUS at 17:42

## 2017-01-01 RX ADMIN — IPRATROPIUM BROMIDE AND ALBUTEROL SULFATE 3 ML: .5; 3 SOLUTION RESPIRATORY (INHALATION) at 08:40

## 2017-01-01 RX ADMIN — DEXTROSE MONOHYDRATE 75 ML/HR: 5 INJECTION, SOLUTION INTRAVENOUS at 20:39

## 2017-01-01 RX ADMIN — Medication 10 ML: at 06:42

## 2017-01-01 RX ADMIN — VALPROIC ACID 250 MG: 250 SOLUTION ORAL at 16:26

## 2017-01-01 RX ADMIN — Medication 10 ML: at 05:24

## 2017-01-01 RX ADMIN — Medication 2 MG: at 03:30

## 2017-01-01 RX ADMIN — Medication 10 ML: at 14:11

## 2017-01-01 RX ADMIN — LEVOTHYROXINE SODIUM 125 MCG: 125 TABLET ORAL at 09:51

## 2017-01-01 RX ADMIN — Medication 10 ML: at 00:28

## 2017-01-01 RX ADMIN — Medication 10 ML: at 11:53

## 2017-01-01 RX ADMIN — ENOXAPARIN SODIUM 40 MG: 40 INJECTION SUBCUTANEOUS at 20:47

## 2017-01-01 RX ADMIN — ASPIRIN 81 MG: 81 TABLET, COATED ORAL at 09:54

## 2017-01-01 RX ADMIN — ATROPINE SULFATE 1 DROP: 10 SOLUTION/ DROPS OPHTHALMIC at 09:12

## 2017-01-01 RX ADMIN — VITAMIN D, TAB 1000IU (100/BT) 1000 UNITS: 25 TAB at 09:51

## 2017-01-01 RX ADMIN — GLYCOPYRROLATE 0.2 MG: 0.2 INJECTION INTRAMUSCULAR; INTRAVENOUS at 14:28

## 2017-01-01 RX ADMIN — POTASSIUM CHLORIDE 20 MEQ: 40 SOLUTION ORAL at 16:46

## 2017-01-01 RX ADMIN — ATORVASTATIN CALCIUM 10 MG: 10 TABLET, FILM COATED ORAL at 09:58

## 2017-01-01 RX ADMIN — LORAZEPAM 1 MG: 2 INJECTION INTRAMUSCULAR; INTRAVENOUS at 16:29

## 2017-01-01 RX ADMIN — ATORVASTATIN CALCIUM 10 MG: 10 TABLET, FILM COATED ORAL at 10:17

## 2017-01-01 RX ADMIN — Medication 2 MG: at 22:21

## 2017-01-01 RX ADMIN — ASPIRIN 81 MG: 81 TABLET, COATED ORAL at 09:50

## 2017-01-01 RX ADMIN — Medication 400 MG: at 09:58

## 2017-01-01 RX ADMIN — POTASSIUM CHLORIDE AND SODIUM CHLORIDE: 900; 300 INJECTION, SOLUTION INTRAVENOUS at 16:47

## 2017-01-01 RX ADMIN — Medication 2 MG: at 21:14

## 2017-01-01 RX ADMIN — Medication 2 MG: at 14:56

## 2017-01-01 RX ADMIN — Medication 2 MG: at 11:38

## 2017-01-01 RX ADMIN — Medication 2 MG: at 06:49

## 2017-01-01 RX ADMIN — GADOBUTROL 6 ML: 604.72 INJECTION INTRAVENOUS at 11:52

## 2017-01-01 RX ADMIN — VALPROIC ACID 250 MG: 250 SOLUTION ORAL at 09:59

## 2017-01-01 RX ADMIN — Medication 10 ML: at 22:15

## 2017-01-01 RX ADMIN — Medication 2 MG: at 23:57

## 2017-01-01 RX ADMIN — Medication 2 MG: at 14:59

## 2017-01-01 RX ADMIN — GLYCOPYRROLATE 0.2 MG: 0.2 INJECTION, SOLUTION INTRAMUSCULAR; INTRAVENOUS at 23:05

## 2017-01-01 RX ADMIN — ACETYLCYSTEINE 200 MG: 200 SOLUTION ORAL; RESPIRATORY (INHALATION) at 13:42

## 2017-01-01 RX ADMIN — DEXTROSE MONOHYDRATE 75 ML/HR: 5 INJECTION, SOLUTION INTRAVENOUS at 19:00

## 2017-01-01 RX ADMIN — LORAZEPAM 1 MG: 2 INJECTION INTRAMUSCULAR; INTRAVENOUS at 11:06

## 2017-01-01 RX ADMIN — FUROSEMIDE 20 MG: 10 INJECTION, SOLUTION INTRAMUSCULAR; INTRAVENOUS at 11:09

## 2017-01-01 RX ADMIN — LEVOTHYROXINE SODIUM 125 MCG: 125 TABLET ORAL at 10:17

## 2017-01-01 RX ADMIN — IPRATROPIUM BROMIDE AND ALBUTEROL SULFATE 3 ML: .5; 3 SOLUTION RESPIRATORY (INHALATION) at 01:17

## 2017-01-01 RX ADMIN — Medication 2 MG: at 17:48

## 2017-01-01 RX ADMIN — VALPROIC ACID 250 MG: 250 SOLUTION ORAL at 09:55

## 2017-01-01 RX ADMIN — HYDRALAZINE HYDROCHLORIDE 10 MG: 20 INJECTION INTRAMUSCULAR; INTRAVENOUS at 16:05

## 2017-01-01 RX ADMIN — Medication 10 ML: at 06:13

## 2017-01-01 RX ADMIN — GLYCOPYRROLATE 0.2 MG: 0.2 INJECTION, SOLUTION INTRAMUSCULAR; INTRAVENOUS at 19:02

## 2017-01-01 RX ADMIN — POTASSIUM CHLORIDE 20 MEQ: 40 SOLUTION ORAL at 09:20

## 2017-01-01 RX ADMIN — Medication 2 MG: at 02:55

## 2017-01-01 RX ADMIN — Medication 2 MG: at 03:07

## 2017-01-01 RX ADMIN — IPRATROPIUM BROMIDE AND ALBUTEROL SULFATE 3 ML: .5; 3 SOLUTION RESPIRATORY (INHALATION) at 19:30

## 2017-01-01 RX ADMIN — BISACODYL 10 MG: 10 SUPPOSITORY RECTAL at 18:05

## 2017-01-01 RX ADMIN — Medication 400 MG: at 08:44

## 2017-01-01 RX ADMIN — ASPIRIN 81 MG: 81 TABLET, COATED ORAL at 09:20

## 2017-01-01 RX ADMIN — HALOPERIDOL LACTATE 3 MG: 5 INJECTION, SOLUTION INTRAMUSCULAR at 14:33

## 2017-01-01 RX ADMIN — LORAZEPAM 1 MG: 2 INJECTION INTRAMUSCULAR; INTRAVENOUS at 00:24

## 2017-01-01 RX ADMIN — ASPIRIN 81 MG: 81 TABLET, COATED ORAL at 10:17

## 2017-01-01 RX ADMIN — FUROSEMIDE 20 MG: 10 INJECTION, SOLUTION INTRAMUSCULAR; INTRAVENOUS at 12:21

## 2017-01-01 RX ADMIN — Medication 10 ML: at 12:21

## 2017-01-01 RX ADMIN — ATORVASTATIN CALCIUM 10 MG: 10 TABLET, FILM COATED ORAL at 09:54

## 2017-01-01 RX ADMIN — DEXTROSE MONOHYDRATE 75 ML/HR: 5 INJECTION, SOLUTION INTRAVENOUS at 04:50

## 2017-01-01 RX ADMIN — SODIUM CHLORIDE 75 ML/HR: 900 INJECTION, SOLUTION INTRAVENOUS at 12:27

## 2017-01-01 RX ADMIN — Medication 10 ML: at 07:32

## 2017-01-01 RX ADMIN — GLYCOPYRROLATE 0.2 MG: 0.2 INJECTION, SOLUTION INTRAMUSCULAR; INTRAVENOUS at 15:49

## 2017-01-01 RX ADMIN — Medication 2 MG: at 19:02

## 2017-01-01 RX ADMIN — VALPROIC ACID 250 MG: 250 SOLUTION ORAL at 10:24

## 2017-01-01 RX ADMIN — POTASSIUM CHLORIDE 20 MEQ: 40 SOLUTION ORAL at 09:54

## 2017-01-01 RX ADMIN — ATROPINE SULFATE 1 DROP: 10 SOLUTION/ DROPS OPHTHALMIC at 15:20

## 2017-01-01 RX ADMIN — GLYCOPYRROLATE 0.2 MG: 0.2 INJECTION, SOLUTION INTRAMUSCULAR; INTRAVENOUS at 12:21

## 2017-01-01 RX ADMIN — IPRATROPIUM BROMIDE AND ALBUTEROL SULFATE 3 ML: .5; 3 SOLUTION RESPIRATORY (INHALATION) at 00:49

## 2017-01-01 RX ADMIN — VITAMIN D, TAB 1000IU (100/BT) 1000 UNITS: 25 TAB at 10:17

## 2017-01-01 RX ADMIN — Medication 2 MG: at 21:49

## 2017-01-01 RX ADMIN — Medication 10 ML: at 19:02

## 2017-01-01 RX ADMIN — VALPROIC ACID 250 MG: 250 SOLUTION ORAL at 16:10

## 2017-01-01 RX ADMIN — Medication 10 ML: at 23:54

## 2017-01-01 RX ADMIN — HYDRALAZINE HYDROCHLORIDE 10 MG: 20 INJECTION INTRAMUSCULAR; INTRAVENOUS at 22:46

## 2017-01-03 PROBLEM — E87.0 HYPERNATREMIA: Status: ACTIVE | Noted: 2017-01-01

## 2017-01-03 PROBLEM — E86.0 DEHYDRATION: Status: ACTIVE | Noted: 2017-01-01

## 2017-01-03 PROBLEM — R41.82 ALTERED MENTAL STATUS: Status: ACTIVE | Noted: 2017-01-01

## 2017-01-03 NOTE — H&P
HISTORY AND PHYSICAL      PCP: [address], [place]  History source: the patient's family      CC: altered mental status      HPI: this is an 80 y.o lady with a history of HTN, subdural hematoma, left orbital meningioma s/p resection, dementia who resides at a memory care unit. She was brought in by EMS for reported weakness. She is a poor historian and doesn't provide any history. Per chart review, staff at her residence noted she had bilateral upper extremity weakness around 8 AM today. Her son notes that she's more lethargic than usual. There have been no noted fevers, complains of pain, cough, dysuria, or diarrhea. Her PO intake has been poor lately, and they note that she has been pocketing some of her food. They're not aware of any facial droop. PMH/PSH:  Past Medical History   Diagnosis Date    Hyperlipemia     Hyperlipemia     Hypertension     Hypothyroid     Osteoporosis     Subdural hematoma, acute [facility]) July 2015     Past Surgical History   Procedure Laterality Date    Hx other surgical       vocal cord polyp    Hx other surgical       mult meningioma resection       Home meds:   Prior to Admission medications    Medication Sig Start Date End Date Taking? Authorizing Provider   aspirin delayed-release 81 mg tablet Take 81 mg by mouth daily. Yes Historical Provider   Menthol-Zinc Oxide (CALMOSEPTINE) 0.44-20.6 % oint Apply  to affected area three (3) times daily. To Buttocks   Yes Historical Provider   guaiFENesin (ORGANIDIN) 400 mg tablet Take 400 mg by mouth daily as needed for Congestion. Yes Historical Provider   levothyroxine (SYNTHROID) 125 mcg tablet Take 125 mcg by mouth Daily (before breakfast). Yes Historical Provider   white petrolatum-mineral oil (REFRESH LACRI-LUBE) 56.8-42.5 % ointment Administer  to both eyes four (4) times daily as needed. Yes Historical Provider   multivitamins-ca-iron-minerals (TAB A ADAM) 18-0.4 mg tab Take 1 Tab by mouth daily.    Yes Historical Provider   acetaminophen (TYLENOL EXTRA STRENGTH) 500 mg tablet Take 500 mg by mouth every six (6) hours as needed for Pain. Yes Historical Provider   valproic acid, as sodium salt, 250 mg/5 mL syrg Take 5 mL by mouth three (3) times daily. Yes Historical Provider   cholecalciferol (VITAMIN D3) 1,000 unit tablet Take 1,000 Units by mouth daily. Yes Historical Provider   atorvastatin (LIPITOR) 10 mg tablet Take 1 Tab by mouth daily. 2/19/16  Yes Alyce Ferrara MD   alendronate (FOSAMAX) 70 mg tablet Take 1 Tab by mouth every Sunday. 2/21/16  Yes Alyce Ferrara MD   fluticasone (FLONASE) 50 mcg/actuation nasal spray 2 Sprays by Both Nostrils route daily as needed for Rhinitis. Yes Historical Provider       Allergies:  No Known Allergies    FH:  History reviewed. No pertinent family history. SH:  Social History   Substance Use Topics    Smoking status: Former Smoker     Types: Cigarettes     Quit date: 1/1/1965    Smokeless tobacco: Never Used    Alcohol use 3.5 oz/week     7 Standard drinks or equivalent per week      Comment: 1 glass wine daily       ROS: Review of systems not obtained due to patient factors.       PHYSICAL EXAM:  Visit Vitals    /76    Pulse 65    Temp 97.1 °F (36.2 °C)    Resp 16    Ht 5' 7\" (1.702 m)    Wt 62 kg (136 lb 11 oz)    SpO2 90%    BMI 21.41 kg/m2       Gen: NAD  HEENT: anicteric sclerae, left eye proptosis with erythematous sclerae, mucous membranes dry  Heart: RRR, no MRG, no JVD, trace pedal edema  Lungs: CTA b/l anteriorly, non-labored respirations  Abd: soft, NT, ND, bs+  Extr: warm  Skin: dry  Neuro: unable to adequately assess  Psych: lethargic, not answering questions appropriately      Labs/Imaging:  Recent Labs      01/03/17   1455   WBC  6.6   HGB  13.8   HCT  42.9   PLT  173     Recent Labs      01/03/17   1455   NA  148*   K  4.1   CL  107   CO2  35*   BUN  22*   CREA  0.61   GLU  94   CA  8.9     Recent Labs      01/03/17   1455   SGOT  14* ALT  17   AP  66   TBILI  0.3   TP  7.1   ALB  3.0*   GLOB  4.1*       Recent Labs      01/03/17   1455   TROIQ  <0.04     There is no acute intracranial abnormality. Stable chronic changes as above. Assessment & Plan: this is an 80 y.o lady with a history of HTN, subdural hematoma, left orbital meningioma s/p resection, dementia who resides at a memory care unit, who presents with altered mental status. AMS: (POA) on chronic dementia with behavioral disturbances. Sudden-onset and reports from NH raises concern for CVA. -CT head without acute findings  -MRI brain to eval for ischemic CVA  -check TSH given hx of hypothyroidism  -check ABG given elevated HCO3 to r/o hypercapnia as a cause, although this likely represents a contraction alkalosis  -no evidence of infection, UA not c/w UTI  -valproic acid level in therapeutic range    Hypernatremia: due to dehydration from poor PO intake  -received IVNS in the ED  -start 0.45% NS    Continue home meds for other chronic problems    DVT ppx: sq Lovenox  Code status: DNR  Disposition: admit to neuro unit; DC back to memory care when appropriate    Plan discussed with family. Her son is  Jeremias Nataly here (ophtho).     Signed By: Cricket Szymanski MD     January 3, 2017

## 2017-01-03 NOTE — ROUTINE PROCESS
TRANSFER - OUT REPORT:    Verbal report given to Mitali Farris RN(name) on Claudetta Hart  being transferred to [facility](unit) for routine progression of care       Report consisted of patients Situation, Background, Assessment and   Recommendations(SBAR). Information from the following report(s) ED Summary was reviewed with the receiving nurse. Lines:   Peripheral IV 01/03/17 Right Antecubital (Active)   Site Assessment Clean, dry, & intact 1/3/2017  2:54 PM   Phlebitis Assessment 0 1/3/2017  2:54 PM   Infiltration Assessment 0 1/3/2017  2:54 PM   Dressing Status Clean, dry, & intact 1/3/2017  2:54 PM   Dressing Type Transparent 1/3/2017  2:54 PM   Hub Color/Line Status Blue 1/3/2017  2:54 PM        Opportunity for questions and clarification was provided.       Patient transported with:   [employer]

## 2017-01-03 NOTE — DISCHARGE INSTRUCTIONS
We hope that we have addressed all of your medical concerns. The examination and treatment you received in the Emergency Department were for an emergent problem and were not intended as complete care. It is important that you follow up with your healthcare provider(s) for ongoing care. If your symptoms worsen or do not improve as expected, and you are unable to reach your usual health care provider(s), you should return to the Emergency Department. Today's healthcare is undergoing tremendous change, and patient satisfaction surveys are one of the many tools to assess the quality of medical care. You may receive a survey from the [employer] regarding your experience in the Emergency Department. I hope that your experience has been completely positive, particularly the medical care that I provided. As such, please participate in the survey; anything less than excellent does not meet my expectations or intentions. On license of [facility] and [address] [place] [address] participate in nationally recognized quality of care measures. If your blood pressure is greater than 120/80, as reported below, we urge that you seek medical care to address the potential of high blood pressure, commonly known as hypertension. Hypertension can be hereditary or can be caused by certain medical conditions, pain, stress, or \"white coat syndrome. \"       Please make an appointment with your health care provider(s) for follow up of your Emergency Department visit. VITALS:   Patient Vitals for the past 8 hrs:   Temp Pulse Resp BP SpO2   01/03/17 1429 97.1 °F (36.2 °C) 65 16 103/80 99 %          Thank you for allowing us to provide you with medical care today. We realize that you have many choices for your emergency care needs. Please choose us in the future for any continued health care needs. Simeon Duff, [facility].   Office: [phone]            Recent Results (from the past 24 hour(s))   EKG, 12 LEAD, INITIAL    Collection Time: 01/03/17  2:45 PM   Result Value Ref Range    Ventricular Rate 66 BPM    Atrial Rate 66 BPM    P-R Interval 126 ms    QRS Duration 90 ms    Q-T Interval 426 ms    QTC Calculation (Bezet) 446 ms    Calculated P Axis 71 degrees    Calculated R Axis -58 degrees    Calculated T Axis 76 degrees    Diagnosis       Normal sinus rhythm  Left anterior fascicular block  Nonspecific ST and T wave abnormality  When compared with ECG of 13-JUN-2016 15:01,  Nonspecific T wave abnormality no longer evident in Anterior leads  Confirmed by Rochelle Cowan MD, Guy ([id number]) on 1/3/2017 4:02:34 PM     CBC WITH AUTOMATED DIFF    Collection Time: 01/03/17  2:55 PM   Result Value Ref Range    WBC 6.6 3.6 - 11.0 K/uL    RBC 5.01 3.80 - 5.20 M/uL    HGB 13.8 11.5 - 16.0 g/dL    HCT 42.9 35.0 - 47.0 %    MCV 85.6 80.0 - 99.0 FL    MCH 27.5 26.0 - 34.0 PG    MCHC 32.2 30.0 - 36.5 g/dL    RDW 14.4 11.5 - 14.5 %    PLATELET 246 590 - 808 K/uL    NEUTROPHILS 57 32 - 75 %    LYMPHOCYTES 37 12 - 49 %    MONOCYTES 5 5 - 13 %    EOSINOPHILS 1 0 - 7 %    BASOPHILS 0 0 - 1 %    ABS. NEUTROPHILS 3.7 1.8 - 8.0 K/UL    ABS. LYMPHOCYTES 2.5 0.8 - 3.5 K/UL    ABS. MONOCYTES 0.4 0.0 - 1.0 K/UL    ABS. EOSINOPHILS 0.1 0.0 - 0.4 K/UL    ABS. BASOPHILS 0.0 0.0 - 0.1 K/UL   METABOLIC PANEL, COMPREHENSIVE    Collection Time: 01/03/17  2:55 PM   Result Value Ref Range    Sodium 148 (H) 136 - 145 mmol/L    Potassium 4.1 3.5 - 5.1 mmol/L    Chloride 107 97 - 108 mmol/L    CO2 35 (H) 21 - 32 mmol/L    Anion gap 6 5 - 15 mmol/L    Glucose 94 65 - 100 mg/dL    BUN 22 (H) 6 - 20 MG/DL    Creatinine 0.61 0.55 - 1.02 MG/DL    BUN/Creatinine ratio 36 (H) 12 - 20      GFR est AA >60 >60 ml/min/1.73m2    GFR est non-AA >60 >60 ml/min/1.73m2    Calcium 8.9 8.5 - 10.1 MG/DL    Bilirubin, total 0.3 0.2 - 1.0 MG/DL    ALT 17 12 - 78 U/L    AST 14 (L) 15 - 37 U/L    Alk. phosphatase 66 45 - 117 U/L    Protein, total 7.1 6.4 - 8.2 g/dL    Albumin 3.0 (L) 3.5 - 5.0 g/dL    Globulin 4.1 (H) 2.0 - 4.0 g/dL    A-G Ratio 0.7 (L) 1.1 - 2.2     URINALYSIS W/ REFLEX CULTURE    Collection Time: 01/03/17  2:55 PM   Result Value Ref Range    Color YELLOW/STRAW      Appearance TURBID (A) CLEAR      Specific gravity 1.023 1.003 - 1.030      pH (UA) 7.5 5.0 - 8.0      Protein NEGATIVE  NEG mg/dL    Glucose NEGATIVE  NEG mg/dL    Ketone NEGATIVE  NEG mg/dL    Bilirubin NEGATIVE  NEG      Blood MODERATE (A) NEG      Urobilinogen 1.0 0.2 - 1.0 EU/dL    Nitrites NEGATIVE  NEG      Leukocyte Esterase NEGATIVE  NEG      WBC 0-4 0 - 4 /hpf    RBC 5-10 0 - 5 /hpf    Epithelial cells MODERATE (A) FEW /lpf    Bacteria NEGATIVE  NEG /hpf    UA:UC IF INDICATED CULTURE NOT INDICATED BY UA RESULT CNI      Mucus 2+ (A) NEG /lpf    Amorphous Crystals 1+ (A) NEG   TROPONIN I    Collection Time: 01/03/17  2:55 PM   Result Value Ref Range    Troponin-I, Qt. <0.04 <0.05 ng/mL   VALPROIC ACID    Collection Time: 01/03/17  2:55 PM   Result Value Ref Range    Valproic acid 62 50 - 100 ug/ml       Ct Head Wo Cont    Result Date: 1/3/2017  EXAM:  CT HEAD WO CONT INDICATION: Weakness bilaterally. COMPARISON: MRI brain 6/15/2016. CT head 6/13/2016. TECHNIQUE: Axial noncontrast head CT from foramen magnum to vertex. Coronal and sagittal reformatted images were obtained. CT dose reduction was achieved through use of a standardized protocol tailored for this examination and automatic exposure control for dose modulation. Adaptive statistical iterative reconstruction (ASIR) was utilized. FINDINGS:  There is diffuse age-related parenchymal volume loss. The ventricles and sulci are age-appropriate without hydrocephalus. There is no mass effect or midline shift. There is no intracranial hemorrhage or extra-axial fluid collection.  Scattered foci of low attenuation in the periventricular white matter represent stable chronic microvascular ischemic changes. There is no new abnormal parenchymal attenuation. There is a stable right occipital meningioma measuring 1.3 cm (series 2, image 15). The basal cisterns are patent. There is intracranial atherosclerosis. Surgical changes are again seen following left pterional craniotomy with generalized increased density of the left temporal and sphenoid bones. The visualized paranasal sinuses and mastoid air cells are clear. IMPRESSION: There is no acute intracranial abnormality. Stable chronic changes as above. Xr Chest Port    Result Date: 1/3/2017  INDICATION: . weAKNESS Additional history: Weakness which was 1st observed at 3976-3200 hours this morning. COMPARISON: Previous chest xray, 6/13/2016. LIMITATIONS: Portable technique. [facility] FINDINGS: Single frontal view of the chest. . Lines/tubes/surgical: None. Heart/mediastinum: Calcifications in the aortic arch. Lungs/pleura: Low lung volumes without definite consolidation. Chronic appearing lung changes. No visualized pleural effusion or pneumothorax. Additional Comments: Remote, right-sided rib fractures. . . IMPRESSION: 1. No radiographic evidence of acute cardiopulmonary disease.

## 2017-01-03 NOTE — ED TRIAGE NOTES
Triage: Resident of [facility] Unit. Staff noticed increased weakness this morning bilaterally around 1473-6163. During lunch staff also noted that she had trouble holding her utensils. Glucose 103. Baseline confused.

## 2017-01-03 NOTE — ED PROVIDER NOTES
HPI Comments: 80 y.o. female with past medical history significant for HTN, subdural hematoma, osteoporosis, multiple meningioma resection who presents via EMS from [facility] with chief complaint of weakness. Pt is noninteractive with baseline confusion. Per EMS, staff at the nursing home noticed that the pt had bilateral upper extremity weakness this morning (between 0800 and 0900). Pt was also unable to lift her eating utensils at lunch today. Pt was fed by staff and then transported to ED. There are no other acute medical concerns at this time. Social hx: pt resides at [facility]  PCP: Leanne Calhoun MD    Chart Review: pt was seen in ED 6/2016 and left eye proptosis noted on physical exam then. Full history, physical exam, and ROS unable to be obtained due to:  confusion. Note written by Vira Ulloa, as dictated by Gage Parkinson MD 2:41 PM      The history is provided by the EMS personnel. Past Medical History:   Diagnosis Date    Hyperlipemia     Hyperlipemia     Hypertension     Hypothyroid     Osteoporosis     Subdural hematoma, acute [facility]) July 2015       Past Surgical History:   Procedure Laterality Date    Hx other surgical       vocal cord polyp    Hx other surgical       mult meningioma resection         History reviewed. No pertinent family history. Social History     Social History    Marital status:      Spouse name: N/A    Number of children: N/A    Years of education: N/A     Occupational History    Not on file.      Social History Main Topics    Smoking status: Former Smoker     Types: Cigarettes     Quit date: 1/1/1965    Smokeless tobacco: Never Used    Alcohol use 3.5 oz/week     7 Standard drinks or equivalent per week      Comment: 1 glass wine daily    Drug use: No    Sexual activity: Not Currently     Other Topics Concern    Not on file     Social History Narrative    Living independently at [address]. 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 ALLERGIES: Review of patient's allergies indicates no known allergies. Review of Systems   Unable to perform ROS: Other       Vitals:    01/03/17 1429 01/03/17 1600 01/03/17 1630 01/03/17 1700   BP: 103/80 173/83 157/69 142/76   Pulse: 65      Resp: 16      Temp: 97.1 °F (36.2 °C)      SpO2: 99% 96% 93% 90%   Weight: 62 kg (136 lb 11 oz)      Height: 5' 7\" (1.702 m)               Physical Exam   Constitutional: She is oriented to person, place, and time. She appears well-developed and well-nourished. No distress. HENT:   Head: Normocephalic and atraumatic. Nose: Nose normal.   Mouth/Throat: Oropharynx is clear and moist.   Eyes: Conjunctivae are normal. No scleral icterus. Proptosis of left eye, with erythema and engorgement   Neck: Normal range of motion. Neck supple. No JVD present. No tracheal deviation present. No thyromegaly present. Cardiovascular: Normal rate, regular rhythm and normal heart sounds. No murmur heard. Pulmonary/Chest: Effort normal and breath sounds normal. No respiratory distress. She has no wheezes. She has no rales. Abdominal: Soft. Bowel sounds are normal. She exhibits no mass. There is no tenderness. There is no rebound and no guarding. Musculoskeletal: Normal range of motion. She exhibits edema (bilateral ankles). Neurological: She is alert and oriented to person, place, and time. No cranial nerve deficit. Coordination normal.   Skin: Skin is warm and dry. No rash noted. She is not diaphoretic. No erythema. Psychiatric: She has a normal mood and affect. Her behavior is normal.   Nursing note and vitals reviewed.    Note written by Vira Liu, as dictated by Robyn Milian MD 2:42 PM      MDM  Number of Diagnoses or Management Options  Elevated CO2 level: new and requires workup  Lethargy: new and requires workup  Somnolence: new and requires workup     Amount and/or Complexity of Data Reviewed  Clinical lab tests: ordered and reviewed  Tests in the radiology section of CPT®: ordered and reviewed  Tests in the medicine section of CPT®: ordered and reviewed  Discussion of test results with the performing providers: yes  Decide to obtain previous medical records or to obtain history from someone other than the patient: yes  Obtain history from someone other than the patient: yes (Family were present)  Review and summarize past medical records: yes  Discuss the patient with other providers: yes  Independent visualization of images, tracings, or specimens: yes    Risk of Complications, Morbidity, and/or Mortality  Presenting problems: high  Diagnostic procedures: high  Management options: high  General comments: Total critical care time was 40 min    Critical Care  Total time providing critical care: 30-74 minutes    Patient Progress  Patient progress: stable    ED Course       Procedures    ED EKG interpretation:  Rhythm: normal sinus rhythm and left anterior fascicular block; and regular . Rate (approx.): 66; Axis: normal; ST/T wave: non-specific changes  Note written by Vira Ba, as dictated by Sridevi Cox MD 4:26 PM    PROGRESS NOTE:  5:13 PM  Will admit pt to hospitalist due to AMS and possible stroke. Per son, pt is not at baseline, she is normally more alert to family members. He notes that she is not eating/drinking/walking and that her health has been declining for the last 2 weeks. He says that the pt can normally feed herself and she has not been doing that. Pt is also wheelchair-bound. He says pt has hx of former smoker when she was younger. She is a DNR. CONSULT NOTE:  5:18 PM Sridevi Cox MD spoke with Dr. Chuy Perry, Consult for Hospitalist.  Discussed available diagnostic tests and clinical findings. Dr. Lory Whitaker will evaluate pt for admit.

## 2017-01-04 NOTE — PROGRESS NOTES
Bedside and Verbal shift change report given to Kyara Awan RN (oncoming nurse) by Fortino Hatchet RN (offgoing nurse). Report included the following information SBAR, Kardex, ED Summary, Intake/Output, MAR, Accordion, Recent Results and Cardiac Rhythm NSR.

## 2017-01-04 NOTE — PROGRESS NOTES
Patient was resting and was not a available for visit. Will follow-up if necessary.      Bernadine Curling, [facility], Orthodox Provider

## 2017-01-04 NOTE — WOUND CARE
WOCN Note:     New consult placed by RN for red left hip and left foot. Chart shows:  Admitted for AMS & dehydration; history of pubic ramus fracture, meningioma resection, subdural hematoma, falls  Admitted from memory care unit    Assessment:   Patient is awake but only says \"yeah\" and requires assists in repositioning. Legs stay partially contracted. Bed: advance with bed alarm  Patient wearing briefs for incontinence and changed form urinary incont episode with RNJuan Pablo. Patient reports no pain. All areas are present on admission. Bilateral heels are bright red but slowly blanching; also down left lateral foot. Bbuttocks and sacral skin intact and without erythema. Left hip blanching erythema with clear margins = 2 x 1.5 x 0 cm  Mepilex border applied. Patient repositioned on left side with pillow between the knees. Heels offloaded in Prevalon boots. Recommendations:    Maintain Mepilex to left hip for pressure relief and change as needed. Prevlaon boots while in bed. Skin Care & Pressure Prevention:  1. Minimize layers of linen/pads under patient to optimize support surface. 2. Turn/reposition approximately every 2 hours. 3. Manage incontinence; Aloe Vesta to buttocks for skin integrity. Discussed above plan with RNJuan Pablo. Transition of Care: Plan to follow weekly and as needed.     ANA Herrera, RN, [facility]  Certified Wound, Ostomy, Continence Nurse  office [phone]  pager [phone] or call  to page

## 2017-01-04 NOTE — PROGRESS NOTES
Bedside and Verbal shift change report given to Kat Feldman (oncoming nurse) by Damaris Toney (offgoing nurse). Report included the following information SBAR, Kardex and Recent Results.

## 2017-01-04 NOTE — INTERDISCIPLINARY ROUNDS
IDR/SLIDR Summary          Patient: Earleen Gaucher MRN: [id number]    Age: 80 y.o. YOB: 1929 Room/Bed: [facility]   Admit Diagnosis: Altered mental status  Principal Diagnosis: Altered mental status   Goals: MRI  Readmission: NO  Quality Measure: Not applicable  VTE Prophylaxis: Mechanical  Influenza Vaccine screening completed? YES  Pneumococcal Vaccine screening completed? NO  Mobility needs: Yes   Nutrition plan:Yes  Consults:Case Management    Financial concerns:No  Escalated to CM? NO  RRAT Score: 17   Interventions:H2H  Testing due for pt today?  YES  LOS: 1 days Expected length of stay 3 days  Discharge plan: back to memory care unit when stable   PCP: Delfino Beltrán MD  Transportation needs: Yes    Days before discharge:two or more days before discharge   Discharge disposition: Nursing Home    Signed:     Will Belle  1/4/2017  2:24 AM

## 2017-01-04 NOTE — PROGRESS NOTES
Care Management Interventions  PCP Verified by CM: Yes  Mode of Transport at Discharge: EARNEST [place]umada Signup: No  Discharge Durable Medical Equipment: No  Physical Therapy Consult: No  Occupational Therapy Consult: No  Speech Therapy Consult: No  Current Support Network: [address] [place] [address] ([facility] memory care unit)  Confirm Follow Up Transport: Other (see comment) (ambulance)  Plan discussed with Pt/Family/Caregiver: Yes  Discharge Location  Discharge Placement: Assisted Living    Chart reviewed for transitions of care, discussed patient during rounds. Patient resides in the memory care unit of [facility] (called [place]). Cm contacted them at [phone] and spoke with nursing who confirmed that prior to admission patient was verbal and able to follow commands. Patient was able to feed herself and was wheelchair bound. They used a guillermo lift for transfers. Patient will return there when medically stable. Cm will continue to follow.   [id number] [place], [place]

## 2017-01-04 NOTE — ROUTINE PROCESS
Primary Nurse Karoline Naylor RN and Malika Gonzalez RN performed a dual skin assessment on this patient Impairment noted- see wound doc flow sheet  José Miguel score is 13

## 2017-01-04 NOTE — PROGRESS NOTES
Hospitalist Progress Note  Edin Noriega MD  Office: [phone]  Cell: [phone]      Date of Service:  2017  NAME:  Elyse Montiel  :  1929  MRN:  [id number]      Admission Summary: This is an 80 y.o lady with a history of HTN, subdural hematoma, left orbital meningioma s/p resection, dementia who resides at a memory care unit. She was brought in by EMS for reported weakness. Family reported she was more lethargic. NH staff reported UE were weaker. Interval history / Subjective:     She is oriented only to self, she could tell me her name ,nothing else. Assessment & Plan: Altered mental status,possibly acute metabolic encephalopathy due to dehydration,hypernatremia vs seizure  with underlying advanced dementia  -There was concern for stroke given reported of extremity weakness  -MRI brain  With prior left pterional craniotomy with no definite change in residual  intraosseous sphenoid wing meningioma, with associated left-sided proptosis. Left temporal lobe encephalomalacia without definite change. Chronic ischemic changes with no acute infarction. Diffuse atrophy. Several other meningiomas and diffuse pachymeningeal enhancement as above. -She is on Depakote. Check EEG    Hypernatremia due to ECF contraction:  -Improved with iv fluids. Hypothyroidism,c/w synthroid. Code status: DNR  DVT prophylaxis: lovenox    Care Plan discussed with: Nurse  Disposition: TBD     Hospital Problems  Date Reviewed: 2016          Codes Class Noted POA    * (Principal)Altered mental status ICD-10-CM: R41.82  ICD-9-CM: 780.97  1/3/2017 Yes        Dehydration ICD-10-CM: E86.0  ICD-9-CM: 276.51  1/3/2017 Yes        Hypernatremia ICD-10-CM: E87.0  ICD-9-CM: 276.0  1/3/2017 Yes                Review of Systems:   Review of systems not obtained due to patient factors.        Vital Signs:    Last 24hrs VS reviewed since prior progress note. Most recent are:  Visit Vitals    /88 (BP Patient Position: At rest)    Pulse 74    Temp 98.1 °F (36.7 °C)    Resp 15    Ht 5' 7\" (1.702 m)    Wt 59.7 kg (131 lb 11.2 oz)    SpO2 94%    Breastfeeding No    BMI 20.63 kg/m2       No intake or output data in the 24 hours ending 01/04/17 1402     Physical Examination:             Constitutional:  Awake,responsive,oriented only to self. ENT:  Left eye bulged out. Resp:  CTA bilaterally. No wheezing/rhonchi/rales. No accessory muscle use   CV:  Regular rhythm, normal rate, no murmurs, gallops, rubs    GI:  Soft, non distended, non tender. normoactive bowel sounds, no hepatosplenomegaly     Musculoskeletal: Pedal edema, SCds on . Neurologic:  AAOx1, CN II-XII reviewed            Data Review:    Review and/or order of clinical lab test  Review and/or order of tests in the radiology section of CPT  Review and/or order of tests in the medicine section of CPT      Labs:     Recent Labs      01/03/17   1455   WBC  6.6   HGB  13.8   HCT  42.9   PLT  173     Recent Labs      01/04/17   0507  01/03/17   1455   NA  142  148*   K  3.4*  4.1   CL  105  107   CO2  29  35*   BUN  11  22*   CREA  0.46*  0.61   GLU  87  94   CA  8.5  8.9   MG  2.0  2.1   PHOS  2.2*  3.1     Recent Labs      01/03/17   1455   SGOT  14*   ALT  17   AP  66   TBILI  0.3   TP  7.1   ALB  3.0*   GLOB  4.1*     No results for input(s): INR, PTP, APTT in the last 72 hours. No lab exists for component: INREXT   No results for input(s): FE, TIBC, PSAT, FERR in the last 72 hours. No results found for: FOL, RBCF   No results for input(s): PH, PCO2, PO2 in the last 72 hours.   Recent Labs      01/03/17   1455   TROIQ  <0.04     Lab Results   Component Value Date/Time    Cholesterol, total 148 06/13/2016 04:06 PM    HDL Cholesterol 51 06/13/2016 04:06 PM    LDL, calculated 77.4 06/13/2016 04:06 PM    Triglyceride 98 06/13/2016 04:06 PM    CHOL/HDL Ratio 2.9 06/13/2016 04:06 PM     Lab Results   Component Value Date/Time    Glucose (POC) 171 06/15/2016 11:10 AM     Lab Results   Component Value Date/Time    Color YELLOW/STRAW 01/03/2017 02:55 PM    Appearance TURBID 01/03/2017 02:55 PM    Specific gravity 1.023 01/03/2017 02:55 PM    pH (UA) 7.5 01/03/2017 02:55 PM    Protein NEGATIVE  01/03/2017 02:55 PM    Glucose NEGATIVE  01/03/2017 02:55 PM    Ketone NEGATIVE  01/03/2017 02:55 PM    Bilirubin NEGATIVE  01/03/2017 02:55 PM    Urobilinogen 1.0 01/03/2017 02:55 PM    Nitrites NEGATIVE  01/03/2017 02:55 PM    Leukocyte Esterase NEGATIVE  01/03/2017 02:55 PM    Epithelial cells MODERATE 01/03/2017 02:55 PM    Bacteria NEGATIVE  01/03/2017 02:55 PM    WBC 0-4 01/03/2017 02:55 PM    RBC 5-10 01/03/2017 02:55 PM         Medications Reviewed:     Current Facility-Administered Medications   Medication Dose Route Frequency    acetaminophen (TYLENOL) tablet 500 mg  500 mg Oral Q6H PRN    aspirin delayed-release tablet 81 mg  81 mg Oral DAILY    atorvastatin (LIPITOR) tablet 10 mg  10 mg Oral DAILY    cholecalciferol (VITAMIN D3) tablet 1,000 Units  1,000 Units Oral DAILY    levothyroxine (SYNTHROID) tablet 125 mcg  125 mcg Oral ACB    valproic acid (as sodium salt) (DEPAKENE) 250 mg/5 mL (5 mL) oral solution 250 mg  250 mg Oral TID    sodium chloride (NS) flush 5-10 mL  5-10 mL IntraVENous Q8H    sodium chloride (NS) flush 5-10 mL  5-10 mL IntraVENous PRN    enoxaparin (LOVENOX) injection 40 mg  40 mg SubCUTAneous Q24H    0.45% sodium chloride infusion  100 mL/hr IntraVENous CONTINUOUS    hydrALAZINE (APRESOLINE) 20 mg/mL injection 10 mg  10 mg IntraVENous Q6H PRN     ______________________________________________________________________  EXPECTED LENGTH OF STAY: 2d 16h  ACTUAL LENGTH OF STAY:           1 Days               iSria Parkinson MD

## 2017-01-05 NOTE — INTERDISCIPLINARY ROUNDS
IDR/SLIDR Summary          Patient: Lauralyn Schwab MRN: [id number]    Age: 80 y.o. YOB: 1929 Room/Bed: [facility]   Admit Diagnosis: Altered mental status  Principal Diagnosis: Altered mental status   Goals: EEG  Readmission: NO  Quality Measure: Not applicable  VTE Prophylaxis: Mechanical  Influenza Vaccine screening completed? YES  Pneumococcal Vaccine screening completed? NO  Mobility needs: Yes   Nutrition plan:Yes  Consults:Case Management    Financial concerns:No  Escalated to CM? NO  RRAT Score: 17   Interventions:H2H  Testing due for pt today?  YES  LOS: 2 days Expected length of stay 3 days  Discharge plan: back to memory care unit when stable   PCP: Kimmy Murillo MD  Transportation needs: Yes    Days before discharge:one day until discharge   Discharge disposition: Nursing Home    Signed:     Jocelyn Apple  1/5/2017  2:24 AM

## 2017-01-05 NOTE — PROGRESS NOTES
Occupational Therapy Screening:  Services are not indicated at this time. [facility] for [facility] therapy based on results obtained during the nursing admission assessment. The patients chart was reviewed and the patient is not appropriate for a skilled therapy evaluation at this time. Please consult occupational therapy if any therapy needs arise. Thank you.   Margarito Arora OTR/L

## 2017-01-05 NOTE — PROGRESS NOTES
Bedside shift change report given to Mikala Baron RN (oncoming nurse) by Mikey Buitrago RN (offgoing nurse).  Report included the following information SBAR, Recent results, MAR, NSR.

## 2017-01-05 NOTE — PROGRESS NOTES
SPEECH THERAPY SCREENING:  SERVICES ARE NOT INDICATED AT THIS TIME    [facility] for speech therapy based on results obtained during the nursing admission assessment. The patients chart was reviewed and the patient is not appropriate for a skilled therapy evaluation at this time. Please consult speech therapy if any therapy needs arise. Thank you. Liyah Jimenez MS, CCC-SLP, BCS-S

## 2017-01-05 NOTE — PROGRESS NOTES
Bedside and Verbal shift change report given to Daylin Saxena (oncoming nurse) by Katina Neal RN (offgoing nurse). Report included the following information SBAR, Kardex, MAR, Accordion, Recent Results and Cardiac Rhythm NSR.

## 2017-01-05 NOTE — PROGRESS NOTES
Hospitalist Progress Note  Demetrius Goodman MD  Office: [phone]  Cell: [phone]      Date of Service:  2017  NAME:  Lauralyn Schwab  :  1929  MRN:  [id number]      Admission Summary: This is an 80 y.o lady with a history of HTN, subdural hematoma, left orbital meningioma s/p resection, dementia who resides at a memory care unit. She was brought in by EMS for reported weakness. Family reported she was more lethargic. NH staff reported UE were weaker. Interval history / Subjective:     She is oriented only to self, she is verbal, seems to be hallucinating,pleasant,does not purposefully follow commands. Assessment & Plan: Altered mental status,possibly acute metabolic encephalopathy due to dehydration,hypernatremia vs seizure  with underlying advanced dementia  -There was concern for stroke given reported of extremity weakness  -MRI brain  With prior left pterional craniotomy with no definite change in residual  intraosseous sphenoid wing meningioma, with associated left-sided proptosis. Left temporal lobe encephalomalacia without definite change. Chronic ischemic changes with no acute infarction. Diffuse atrophy. Several other meningiomas and diffuse pachymeningeal enhancement as above. -Check EEG  -She is on Depakote not for seizure per he son, Dr Ashly Casper.  -Definitely she is more alert today and but may not be back to baseline yet per son. Hypernatremia due to ECF contraction:  -Improved with iv fluids. Hypothyroidism,c/w synthroid. Mild hypokalemia: corrected. Code status: DNR  DVT prophylaxis: lovenox    Care Plan discussed with: Nurse I called and discussed with her son,Dr Ashly Casper. Per him she is better but not back to her baseline,she is normally verbal,feeds self and follows commands. She is WC bound.   Disposition: TBD     Hospital Problems  Date Reviewed: 2016          Codes Class Noted POA    * (Principal)Altered mental status ICD-10-CM: R41.82  ICD-9-CM: 780.97  1/3/2017 Yes        Dehydration ICD-10-CM: E86.0  ICD-9-CM: 276.51  1/3/2017 Yes        Hypernatremia ICD-10-CM: E87.0  ICD-9-CM: 276.0  1/3/2017 Yes                Review of Systems:   Review of systems not obtained due to patient factors. Vital Signs:    Last 24hrs VS reviewed since prior progress note. Most recent are:  Visit Vitals    /52 (BP 1 Location: Right arm, BP Patient Position: At rest)    Pulse 76    Temp 98.1 °F (36.7 °C)    Resp 18    Ht 5' 7\" (1.702 m)    Wt 59.6 kg (131 lb 8 oz)    SpO2 97%    Breastfeeding No    BMI 20.6 kg/m2       No intake or output data in the 24 hours ending 01/05/17 1144     Physical Examination:             Constitutional:  Awake,responsive,oriented only to self. ENT:  Left eye bulged out. Resp:  CTA bilaterally. No wheezing/rhonchi/rales. No accessory muscle use   CV:  Regular rhythm, normal rate, no murmurs, gallops, rubs    GI:  Soft, non distended, non tender. normoactive bowel sounds, no hepatosplenomegaly     Musculoskeletal: Pedal edema, SCds on . Neurologic:  AAOx1, CN II-XII reviewed            Data Review:    Review and/or order of clinical lab test  Review and/or order of tests in the radiology section of CPT  Review and/or order of tests in the medicine section of CPT      Labs:     Recent Labs      01/03/17   1455   WBC  6.6   HGB  13.8   HCT  42.9   PLT  173     Recent Labs      01/05/17   0228  01/04/17   0507  01/03/17   1455   NA  143  142  148*   K  4.0  3.4*  4.1   CL  110*  105  107   CO2  29  29  35*   BUN  11  11  22*   CREA  0.47*  0.46*  0.61   GLU  86  87  94   CA  7.9*  8.5  8.9   MG   --   2.0  2.1   PHOS   --   2.2*  3.1     Recent Labs      01/03/17   1455   SGOT  14*   ALT  17   AP  66   TBILI  0.3   TP  7.1   ALB  3.0*   GLOB  4.1*     No results for input(s): INR, PTP, APTT in the last 72 hours.     No lab exists for component: INREXT, INREXT   No results for input(s): FE, TIBC, PSAT, FERR in the last 72 hours. No results found for: FOL, RBCF   No results for input(s): PH, PCO2, PO2 in the last 72 hours.   Recent Labs      01/03/17   1455   TROIQ  <0.04     Lab Results   Component Value Date/Time    Cholesterol, total 148 06/13/2016 04:06 PM    HDL Cholesterol 51 06/13/2016 04:06 PM    LDL, calculated 77.4 06/13/2016 04:06 PM    Triglyceride 98 06/13/2016 04:06 PM    CHOL/HDL Ratio 2.9 06/13/2016 04:06 PM     Lab Results   Component Value Date/Time    Glucose (POC) 171 06/15/2016 11:10 AM     Lab Results   Component Value Date/Time    Color YELLOW/STRAW 01/03/2017 02:55 PM    Appearance TURBID 01/03/2017 02:55 PM    Specific gravity 1.023 01/03/2017 02:55 PM    pH (UA) 7.5 01/03/2017 02:55 PM    Protein NEGATIVE  01/03/2017 02:55 PM    Glucose NEGATIVE  01/03/2017 02:55 PM    Ketone NEGATIVE  01/03/2017 02:55 PM    Bilirubin NEGATIVE  01/03/2017 02:55 PM    Urobilinogen 1.0 01/03/2017 02:55 PM    Nitrites NEGATIVE  01/03/2017 02:55 PM    Leukocyte Esterase NEGATIVE  01/03/2017 02:55 PM    Epithelial cells MODERATE 01/03/2017 02:55 PM    Bacteria NEGATIVE  01/03/2017 02:55 PM    WBC 0-4 01/03/2017 02:55 PM    RBC 5-10 01/03/2017 02:55 PM         Medications Reviewed:     Current Facility-Administered Medications   Medication Dose Route Frequency    0.9% sodium chloride with KCl 40 mEq/L infusion   IntraVENous CONTINUOUS    potassium chloride (KAON 10%) 20 mEq/15 mL oral liquid 20 mEq  20 mEq Oral DAILY    acetaminophen (TYLENOL) tablet 500 mg  500 mg Oral Q6H PRN    aspirin delayed-release tablet 81 mg  81 mg Oral DAILY    atorvastatin (LIPITOR) tablet 10 mg  10 mg Oral DAILY    cholecalciferol (VITAMIN D3) tablet 1,000 Units  1,000 Units Oral DAILY    levothyroxine (SYNTHROID) tablet 125 mcg  125 mcg Oral ACB    valproic acid (as sodium salt) (DEPAKENE) 250 mg/5 mL (5 mL) oral solution 250 mg  250 mg Oral TID    sodium chloride (NS) flush 5-10 mL 5-10 mL IntraVENous Q8H    sodium chloride (NS) flush 5-10 mL  5-10 mL IntraVENous PRN    enoxaparin (LOVENOX) injection 40 mg  40 mg SubCUTAneous Q24H    hydrALAZINE (APRESOLINE) 20 mg/mL injection 10 mg  10 mg IntraVENous Q6H PRN     ______________________________________________________________________  EXPECTED LENGTH OF STAY: 2d 16h  ACTUAL LENGTH OF STAY:           2 Days               Cady Jamison MD

## 2017-01-05 NOTE — PROGRESS NOTES
Physical Therapy Screening:  Services are not indicated at this time. An [facility] screening referral was triggered for physical therapy based on results obtained during the nursing admission assessment. The patients chart was reviewed and the patient is not appropriate for a skilled therapy evaluation at this time. Please consult physical therapy if any therapy needs arise. Thank you.     Jared Estes, PT

## 2017-01-05 NOTE — ROUTINE PROCESS
Bedside shift change report given to [facility], RN (oncoming nurse) by Tariq Srivastava RN (offgoing nurse).  Report included the following information SBAR, Kardex, Intake/Output, MAR, Recent Results and Cardiac Rhythm SR.

## 2017-01-06 NOTE — PROGRESS NOTES
Giuliano contacted patients' son (Dr. Kameron Beltran, [phone]) to discuss the discharge plan. Patient may be discharged over the weekend and will return to [facility] assisted living section at [facility]. Son is in full agreement and is aware she will go by ambulance. Giuliano contacted [facility] ([phone]) and spoke with the first floor charge nurse, Toshia Lambert to give an update. To call report, the nurse can call [phone]. Will continue to follow.   [employer] , [place]

## 2017-01-06 NOTE — CONSULTS
[address]   [address] [facility], [address]   [address]       Name:  Shawna An   MR#:  [id number]   :  1929   Account #:  [de-identified]    Date of Consultation:  2017   Date of Adm:  2017       REQUESTING PHYSICIAN: Romy Bueno MD    REASON FOR EVALUATION: Altered mental status. HISTORY OF PRESENT ILLNESS: The patient is an 61-year-old   female with history of hypertension, septal hematoma, left-sided orbital   meningioma status post craniotomy/resection, dementia who lives at   [facility]. She brought into the hospital as she was noted to   be weak in both arms and more lethargic than usual. She has also had   poor p.o. intake. She was found to have dehydration, hypernatremia,   and is being treated with IV fluids. She has been on Depakote, not for   seizures, but for apparent mood stabilization. Family believes that   Depakote could be contributing to her lethargy. She did get an EEG   done here, which showed occasional focal epileptiform disturbance in   the left frontal area in the region of craniotomy. Clinically, she has not   changed much, and she is confused and occasionally picks on things   and talks irrelevant. This is a change from her baseline. PAST MEDICAL HISTORY: As mentioned above. HOME MEDICATIONS:   1. Aspirin. 2. Synthroid. 3. Valproic acid 250 mg 3 times daily. 4. Lipitor 10 mg daily. 5. Fosamax. 6. Flonase nasal spray. ALLERGIES: NONE. FAMILY HISTORY: Noncontributory. SOCIAL HISTORY: Used to smoke in the past, quit in , may drink   a glass of wine daily. PHYSICAL EXAMINATION   GENERAL: The patient is sitting in bed and was being fed lunch by the   nurse. She does not make eye contact and did not verbalize with me. VITAL SIGNS: Blood pressure 155/62, temperature 98.5, pulse is 99. NEUROLOGIC: She was able to follow some commands and tried to   stick her tongue out when asked.  She was constantly picking on stuff   on her bed sheets. She was also pointing to someone standing by the   side. 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 Right pupil   is reactive. Face is symmetric. She is able to move both upper   extremities and moved her feet on both sides. Deep tendon reflexes   are hypoactive. Toes are downgoing. Sensory, cerebellar and gait   exam was deferred. HEART: Regular rate. CHEST: Clear. ABDOMEN: Soft, nontender. EXTREMITIES: No edema. LABORATORY DATA: CBC with WBC 6.6, hemoglobin 15.8,   hematocrit 42.9, platelets 268. Urinalysis negative for any infection. Chemistry: Sodium 147, potassium 4.0, BUN 10, creatinine 0.60. Valproic acid level is 15. MRI scan of the brain shows evidence of a   previous left pterional craniotomy. There is residual interosseous sphenoid   wing meningioma, there is left temporal lobe encephalomalacia without   change. No acute infarctions. There are several other meningiomas and   leptomeningeal enhancement as above. ASSESSMENT AND PLAN: An 43-year-old female with a history of   dementia, previous left-sided craniotomy for sphenoid wing   meningioma, left frontal encephalomalacia, who was admitted for   lethargy and confusion. Her EEG shows focal epileptiform disturbance   in the left frontal area. She is hallucinating on exam. I suspect this is a   metabolic encephalopathy/delirium. The concern that Depakote level   may be adding to her lethargy, but the level is subtherapeutic today   and there is not much overall change. I suggest we should continue   250 mg 3 times a day and repeat a level tomorrow. Continue treatment   of the underlying issues, i.e., IV fluids and follow CMP. Neurology will   continue to follow clinically. Thank you for this consultation.         MD ROBBY Chavez / JEZ   D:  01/06/2017   14:23   T:  01/06/2017   14:56   Job #:  [id number]

## 2017-01-06 NOTE — CONSULTS
Consult dictated. Hx of dementia, left pterional craniotomy for meniningioma, left frontal encephalomalacia admitted for lethargy and confusion. EEG with focal epileptiform disturbance in left frontal area. Hallucinating on exam. Suspect metabolic encephalopathy/delirium. Depakote level sub therapeutic today. Continue 250 mg TID and repeat level tomorrow. Continue IVF and follow CMP. Supportive care.

## 2017-01-06 NOTE — INTERDISCIPLINARY ROUNDS
IDR/SLIDR Summary          Patient: Janay Levy MRN: [id number]    Age: 80 y.o. YOB: 1929 Room/Bed: [facility]   Admit Diagnosis: Altered mental status  Principal Diagnosis: Altered mental status   Goals: Maintain normal Na levels; discharge planning  Readmission: NO  Quality Measure: Not applicable  VTE Prophylaxis: Mechanical  Influenza Vaccine screening completed? YES  Pneumococcal Vaccine screening completed? NO  Mobility needs: Yes   Nutrition plan:Yes  Consults:P.T, O.T. and Case Management    Financial concerns:No  Escalated to CM? YES  RRAT Score: 17   Interventions:N/A  Testing due for pt today?  NO  LOS: 4 days Expected length of stay 5 days  Discharge plan: [facility] when stable   PCP: Janes Lau MD  Transportation needs: Yes    Days before discharge:one day until discharge   Discharge disposition: Nursing Home    Signed:     Dai Paulino  1/6/2017  12:26 PM

## 2017-01-06 NOTE — PROGRESS NOTES
Bedside shift change report given to Lizabeth Gatica RN and Vamshi Whalen RN (oncoming nurse) by Jan Garcia RN (offgoing nurse). Report included the following information SBAR, Kardex, Intake/Output, MAR, Recent Results and Cardiac Rhythm NSR.

## 2017-01-06 NOTE — PROGRESS NOTES
Hospitalist Progress Note  Mitzi Ludwig MD  Office: [phone]  Cell: [phone]      Date of Service:  2017  NAME:  Jeremy Durham  :  1929  MRN:  [id number]      Admission Summary: This is an 80 y.o lady with a history of HTN, subdural hematoma, left orbital meningioma s/p resection, dementia who resides at a memory care unit. She was brought in by EMS for reported weakness. Family reported she was more lethargic. NH staff reported UE were weaker. Interval history / Subjective:     She is oriented only to self, she talks clearly but still confused and would not answer questions directly. She said\" nice to meet you,\" as I leave the room. Assessment & Plan: Altered mental status,possibly acute metabolic encephalopathy due to dehydration,hypernatremia vs seizure  with underlying advanced dementia  -There was concern for stroke given reported of extremity weakness  -MRI brain with prior left pterional craniotomy with no definite change in residual  intraosseous sphenoid wing meningioma, with associated left-sided proptosis. Left temporal lobe encephalomalacia without definite change. Chronic ischemic changes with no acute infarction. Diffuse atrophy. Several other meningiomas and diffuse pachymeningeal enhancement as above. - EEG,epileptoform discharge on the left temporal region match the left encephalomalacia. Asked neurology to advise if additional AED needed  -She is on Depakote not for seizure per he son, Dr Vera Luna. Her son noted that she is becoming more lethargic and not able to eat and drink much lately and thinks if Depakote is causing this. Hypernatremia due to ECF contraction:  -Na+ up today after initial improvement. Switch fluid to D5%    Hypothyroidism,c/w synthroid. Mild hypokalemia: corrected.       Code status: DNR  DVT prophylaxis: lovenox    Care Plan discussed with: Nurse  I called and discussed with her son,Dr Iibs Brock. Per him she is better but not back to her baseline,she is normally verbal,feeds self and follows commands. She is WC bound. Disposition: TBD     Hospital Problems  Date Reviewed: 6/13/2016          Codes Class Noted POA    * (Principal)Altered mental status ICD-10-CM: R41.82  ICD-9-CM: 780.97  1/3/2017 Yes        Dehydration ICD-10-CM: E86.0  ICD-9-CM: 276.51  1/3/2017 Yes        Hypernatremia ICD-10-CM: E87.0  ICD-9-CM: 276.0  1/3/2017 Yes                Review of Systems:   Review of systems not obtained due to patient factors. Vital Signs:    Last 24hrs VS reviewed since prior progress note. Most recent are:  Visit Vitals    /67    Pulse 94    Temp 98.5 °F (36.9 °C)    Resp 19    Ht 5' 7\" (1.702 m)    Wt 59 kg (130 lb)    SpO2 96%    Breastfeeding No    BMI 20.36 kg/m2       No intake or output data in the 24 hours ending 01/06/17 0920     Physical Examination:             Constitutional:  Awake,responsive,oriented only to self. ENT:  Left eye bulged out. Resp:  CTA bilaterally. No wheezing/rhonchi/rales. No accessory muscle use   CV:  Regular rhythm, normal rate, no murmurs, gallops, rubs    GI:  Soft, non distended, non tender. normoactive bowel sounds, no hepatosplenomegaly     Musculoskeletal: Pedal edema, SCds on .     Neurologic:  AAOx1, CN II-XII reviewed            Data Review:    Review and/or order of clinical lab test  Review and/or order of tests in the radiology section of CPT  Review and/or order of tests in the medicine section of CPT      Labs:     Recent Labs      01/03/17   1455   WBC  6.6   HGB  13.8   HCT  42.9   PLT  173     Recent Labs      01/06/17   0259  01/05/17   0228  01/04/17   0507  01/03/17   1455   NA  147*  143  142  148*   K  4.0  4.0  3.4*  4.1   CL  109*  110*  105  107   CO2  26  29  29  35*   BUN  10  11  11  22*   CREA  0.60  0.47*  0.46*  0.61   GLU  89  86  87  94   CA  8.5  7.9*  8.5  8.9   MG  1.7   --   2.0  2.1   PHOS   -- --   2.2*  3.1     Recent Labs      01/03/17   1455   SGOT  14*   ALT  17   AP  66   TBILI  0.3   TP  7.1   ALB  3.0*   GLOB  4.1*     No results for input(s): INR, PTP, APTT in the last 72 hours. No lab exists for component: INREXT, INREXT   No results for input(s): FE, TIBC, PSAT, FERR in the last 72 hours. No results found for: FOL, RBCF   No results for input(s): PH, PCO2, PO2 in the last 72 hours.   Recent Labs      01/03/17   1455   TROIQ  <0.04     Lab Results   Component Value Date/Time    Cholesterol, total 148 06/13/2016 04:06 PM    HDL Cholesterol 51 06/13/2016 04:06 PM    LDL, calculated 77.4 06/13/2016 04:06 PM    Triglyceride 98 06/13/2016 04:06 PM    CHOL/HDL Ratio 2.9 06/13/2016 04:06 PM     Lab Results   Component Value Date/Time    Glucose (POC) 171 06/15/2016 11:10 AM     Lab Results   Component Value Date/Time    Color YELLOW/STRAW 01/03/2017 02:55 PM    Appearance TURBID 01/03/2017 02:55 PM    Specific gravity 1.023 01/03/2017 02:55 PM    pH (UA) 7.5 01/03/2017 02:55 PM    Protein NEGATIVE  01/03/2017 02:55 PM    Glucose NEGATIVE  01/03/2017 02:55 PM    Ketone NEGATIVE  01/03/2017 02:55 PM    Bilirubin NEGATIVE  01/03/2017 02:55 PM    Urobilinogen 1.0 01/03/2017 02:55 PM    Nitrites NEGATIVE  01/03/2017 02:55 PM    Leukocyte Esterase NEGATIVE  01/03/2017 02:55 PM    Epithelial cells MODERATE 01/03/2017 02:55 PM    Bacteria NEGATIVE  01/03/2017 02:55 PM    WBC 0-4 01/03/2017 02:55 PM    RBC 5-10 01/03/2017 02:55 PM         Medications Reviewed:     Current Facility-Administered Medications   Medication Dose Route Frequency    dextrose 5% infusion  75 mL/hr IntraVENous CONTINUOUS    potassium chloride (KAON 10%) 20 mEq/15 mL oral liquid 20 mEq  20 mEq Oral DAILY    acetaminophen (TYLENOL) tablet 500 mg  500 mg Oral Q6H PRN    aspirin delayed-release tablet 81 mg  81 mg Oral DAILY    atorvastatin (LIPITOR) tablet 10 mg  10 mg Oral DAILY    cholecalciferol (VITAMIN D3) tablet 1,000 Units  1,000 Units Oral DAILY    levothyroxine (SYNTHROID) tablet 125 mcg  125 mcg Oral ACB    valproic acid (as sodium salt) (DEPAKENE) 250 mg/5 mL (5 mL) oral solution 250 mg  250 mg Oral TID    sodium chloride (NS) flush 5-10 mL  5-10 mL IntraVENous Q8H    sodium chloride (NS) flush 5-10 mL  5-10 mL IntraVENous PRN    enoxaparin (LOVENOX) injection 40 mg  40 mg SubCUTAneous Q24H    hydrALAZINE (APRESOLINE) 20 mg/mL injection 10 mg  10 mg IntraVENous Q6H PRN     ______________________________________________________________________  EXPECTED LENGTH OF STAY: 3d 12h  ACTUAL LENGTH OF STAY:           3 Days               Epi Bose MD

## 2017-01-06 NOTE — PROGRESS NOTES
Bedside and Verbal shift change report given to [address] [facility] [address] (oncoming nurse) by Alma Finney (offgoing nurse). Report included the following information SBAR, Kardex and Recent Results.

## 2017-01-07 NOTE — CONSULTS
Neurology Progress Note    Patient ID:  Rissa Barrera  [id number]  60 y.o.  6/22/1929    Chief Complaint: Altered mental status    Subjective:     80-year-old woman in the hospital for altered mental status and weakness. EEG is abnormal.  She was also dehydrated and hypernatremic on presentation. This morning her Depakote level is more appropriate. Still very lethargic. Objective:       ROS:  Cannot obtain secondary to patient medical condition      Meds:  Current Facility-Administered Medications   Medication Dose Route Frequency    dextrose 5% infusion  75 mL/hr IntraVENous CONTINUOUS    magnesium oxide (MAG-OX) tablet 400 mg  400 mg Oral DAILY    potassium chloride (KAON 10%) 20 mEq/15 mL oral liquid 20 mEq  20 mEq Oral DAILY    acetaminophen (TYLENOL) tablet 500 mg  500 mg Oral Q6H PRN    aspirin delayed-release tablet 81 mg  81 mg Oral DAILY    atorvastatin (LIPITOR) tablet 10 mg  10 mg Oral DAILY    cholecalciferol (VITAMIN D3) tablet 1,000 Units  1,000 Units Oral DAILY    levothyroxine (SYNTHROID) tablet 125 mcg  125 mcg Oral ACB    valproic acid (as sodium salt) (DEPAKENE) 250 mg/5 mL (5 mL) oral solution 250 mg  250 mg Oral TID    sodium chloride (NS) flush 5-10 mL  5-10 mL IntraVENous Q8H    sodium chloride (NS) flush 5-10 mL  5-10 mL IntraVENous PRN    enoxaparin (LOVENOX) injection 40 mg  40 mg SubCUTAneous Q24H    hydrALAZINE (APRESOLINE) 20 mg/mL injection 10 mg  10 mg IntraVENous Q6H PRN       MRI Results (maximum last 3):     Results from [place] encounter on 01/03/17   MRI BRAIN W WO CONT   Narrative INDICATION:  sudden AMS and weakness; eval for stroke     COMPARISON:  Michelle 15, 2016 noncontrast MRI    TECHNIQUE:  MR imaging of the brain was performed with sagittal T1, axial T1,  T2, FLAIR, GRE, DWI/ADC; pre and post contrast multiplanar T1 utilizing 6 mL  Gadavist.    FINDINGS:      There is moderate global volume loss resulting in prominence of the sulci,  cisterns and ventricles without hydrocephalus or midline shift. There is no  acute intra or extra-axial fluid collection. Encephalomalacia with volume loss  in the anterior left temporal lobe has not changed significantly. Chronic  microvascular ischemic changes throughout the supratentorial brain and to a  lesser degree mirta similar to prior study. Small remote infarction right  cerebellum. There is no acute infarction. The major intracranial vascular  flow-voids are patent. Patient has undergone prior left pterional craniotomy. Allowing for lack of IV contrast on the prior study, there has been no definite  change in residual enhancing soft tissue associated with the left sphenoid wing,  cavernous sinus and lateral orbital wall extending both into the middle cranial  fossa as well as in the infratemporal fossa consistent with known intraosseous  meningioma. Left-sided proptosis is without definite change. Mild diffuse dural  enhancement likely related to prior craniotomy. The right occipital 1.3 x 1.0 cm  meningioma and left posterior parafalx seen 0.7 x 0.5 cm meningioma likely  unchanged allowing for technical differences. There is also a subtle 4 mm  nodular enhancing focus along the left parietal dura likely an additional very  small meningioma. Nodular extra-axial enhancing focus right middle cranial fossa  near the sphenoid wing likely an additional meningioma which measures 6 x 6 mm. No abnormal parenchymal enhancement. Impression IMPRESSION:    1. Prior left pterional craniotomy with no definite change in residual  intraosseous sphenoid wing meningioma, with associated left-sided proptosis. Left temporal lobe encephalomalacia without definite change. 2. Chronic ischemic changes with no acute infarction. Diffuse atrophy. 3. Several other meningiomas and diffuse pachymeningeal enhancement as above.             Results from [place] encounter on 06/13/16   MRI BRAIN WO CONT   Narrative **Final Report**      ICD Codes / Adm. Diagnosis: 780.97  R41.82 / Altered mental status  Altered   mental status, unspeci  Examination:  MR BRAIN WO CON  - [id number] - Elias 15 2016  2:38PM  Accession No:  [id number]  Reason:  lethargy      REPORT:  INDICATION:   lethargy     EXAMINATION:  MRI BRAIN WO CONTRAST    COMPARISON:  Prior CT examinations dating back to July 14, 2015    TECHNIQUE:  MR imaging of the brain was performed with sagittal T1, axial   T1, T2, FLAIR, GRE, DWI/ADC, coronal T2.    FINDINGS:      There is moderate global volume loss resulting in prominence of the sulci,   cisterns and ventricles without hydrocephalus or midline shift. Mild diffuse   dural thickening suspected particularly in the left frontal region. Patient   has undergone previous left pterional craniotomy, with adjacent temporal   lobe volume loss and increased T2 signal. There is also ill-defined soft   tissue thickening around the lateral left orbital wall and involving the   extraconal left orbit with associated left proptosis though this is similar   to numerous prior examinations. There is mild chronic microvascular ischemic   disease throughout the supratentorial brain and mirta. There is no acute   infarction. Chronic infarction posterior right cerebellum. Major   intracranial vascular flow-voids are patent. IMPRESSION:  Chronic ischemic changes, as well as postoperative changes related to left   pterional craniotomy, involving the sphenoid wing and orbits as above   similar to prior studies. No acute infarction.            Signing/Reading Doctor: Kaia Arechiga ([id number])    Approved: Kaia Arechiga ([id number])  Elias 15 2016  2:59PM                                     Lab Review   Recent Results (from the past 24 hour(s))   METABOLIC PANEL, BASIC    Collection Time: 01/07/17  2:51 AM   Result Value Ref Range    Sodium 136 136 - 145 mmol/L    Potassium 3.6 3.5 - 5.1 mmol/L    Chloride 102 97 - 108 mmol/L    CO2 26 21 - 32 mmol/L    Anion gap 8 5 - 15 mmol/L    Glucose 148 (H) 65 - 100 mg/dL    BUN 11 6 - 20 MG/DL    Creatinine 0.58 0.55 - 1.02 MG/DL    BUN/Creatinine ratio 19 12 - 20      GFR est AA >60 >60 ml/min/1.73m2    GFR est non-AA >60 >60 ml/min/1.73m2    Calcium 8.3 (L) 8.5 - 10.1 MG/DL   VALPROIC ACID    Collection Time: 01/07/17  2:51 AM   Result Value Ref Range    Valproic acid 51 50 - 100 ug/ml       Additional comments:I reviewed the patient's new clinical lab test results. Patient Vitals for the past 8 hrs:   BP Temp Pulse Resp SpO2   01/07/17 0800 - - - - 99 %   01/07/17 0700 131/69 98.6 °F (37 °C) 99 28 96 %          01/05 1901 - 01/07 0700  In: 290 [P.O.:290]  Out: -     Exam:  Visit Vitals    /69 (BP 1 Location: Left arm, BP Patient Position: At rest)    Pulse 99    Temp 98.6 °F (37 °C)    Resp 28    Ht 5' 7\" (1.702 m)    Wt 58.7 kg (129 lb 6.6 oz)    SpO2 99%    Breastfeeding No    BMI 20.27 kg/m2     Gen: Well developed  CV: RRR  Lungs: non labored breathing  Abd: soft, non distended  Neuro: Asleep, eyes opens to tactile stimulation. CN II-XII: Left eye protruding, face symmetric   Motor: grimace to pain  COOR: no abnl movts  Gait: def    PROBLEM LIST:     Patient Active Problem List   Diagnosis Code    Acquired hypothyroidism E03.9    Hyperlipidemia E78.5    S/P resection of meningioma Z98.890, Z86.018    Ocular proptosis H05.20    Pubic ramus fracture (HCC) S32.599A    Osteopenia, senile M85.80    Hypovitaminosis D E55.9    Age-related cognitive decline R41.81    Subdural hematoma (HCC) I62.00    Subdural hematoma caused by concussion ([address] S06.5X9A    Ankle edema M25.473    Frequent falls R29.6    Lethargy R53.83    Altered mental status R41.82    Dehydration E86.0    Hypernatremia E87.0       Assessment/Plan:      80year-old woman with metabolic encephalopathy. Lethargy could also be medication induced combined with the aforementioned.   Continue Depakote as is for now she is therapeutic. Supportive care. We will follow peripherally.           Signed:  Margarita Alvarenga DO  1/7/2017  11:01 AM

## 2017-01-07 NOTE — PROGRESS NOTES
Hospitalist Progress Note  Lianna Singh MD  Office: [phone]  Cell: [phone]      Date of Service:  2017  NAME:  Macario Bullock  :  1929  MRN:  [id number]      Admission Summary: This is an 80 y.o lady with a history of HTN, subdural hematoma, left orbital meningioma s/p resection, dementia who resides at a memory care unit. She was brought in by EMS for reported weakness. Family reported she was more lethargic. NH staff reported UE were weaker. Interval history / Subjective:     She is sleepy today. Assessment & Plan: Altered mental status,possibly acute metabolic encephalopathy due to dehydration,hypernatremia vs seizure  with underlying advanced dementia  -There was concern for stroke given reported of extremity weakness  -MRI brain with prior left pterional craniotomy with no definite change in residual  intraosseous sphenoid wing meningioma, with associated left-sided proptosis. Left temporal lobe encephalomalacia without definite change. Chronic ischemic changes with no acute infarction. Diffuse atrophy. Several other meningiomas and diffuse pachymeningeal enhancement as above. - EEG,epileptoform discharge on the left temporal region match the left encephalomalacia.  -She is on Depakote not for seizure per he son, Dr Terrence Campos. Her son noted that she is becoming more lethargic and not able to eat and drink much lately and thinks if Depakote is causing this.   -Discussed with neurologist,recommended to keep the Depakote       Hypernatremia due to ECF contraction:  -Na corrected with D5%    Hypothyroidism,c/w synthroid. Mild hypokalemia: corrected. Code status: DNR  DVT prophylaxis: lovenox    Care Plan discussed with: Nurse  I called and discussed with her son,Dr Terrence Campos. Per him she is better but not back to her baseline,she is normally verbal,feeds self and follows commands. She is WC bound.   Disposition: TBD Back to admission address . Its likely going to be on Monday given the inclement weather over the weekend. Hospital Problems  Date Reviewed: 6/13/2016          Codes Class Noted POA    * (Principal)Altered mental status ICD-10-CM: R41.82  ICD-9-CM: 780.97  1/3/2017 Yes        Dehydration ICD-10-CM: E86.0  ICD-9-CM: 276.51  1/3/2017 Yes        Hypernatremia ICD-10-CM: E87.0  ICD-9-CM: 276.0  1/3/2017 Yes                Review of Systems:   Review of systems not obtained due to patient factors. Vital Signs:    Last 24hrs VS reviewed since prior progress note. Most recent are:  Visit Vitals    /69 (BP 1 Location: Left arm, BP Patient Position: At rest)    Pulse 99    Temp 98.6 °F (37 °C)    Resp 28    Ht 5' 7\" (1.702 m)    Wt 58.7 kg (129 lb 6.6 oz)    SpO2 96%    Breastfeeding No    BMI 20.27 kg/m2         Intake/Output Summary (Last 24 hours) at 01/07/17 0930  Last data filed at 01/06/17 1200   Gross per 24 hour   Intake               50 ml   Output                0 ml   Net               50 ml        Physical Examination:             Constitutional:  Sleepy. ENT:  Left eye bulged out. Resp:  CTA bilaterally. No wheezing/rhonchi/rales. No accessory muscle use   CV:  Regular rhythm, normal rate, no murmurs, gallops, rubs    GI:  Soft, non distended, non tender. normoactive bowel sounds, no hepatosplenomegaly     Musculoskeletal: Pedal edema, SCds on . Neurologic:  AAOx1, CN II-XII reviewed            Data Review:    Review and/or order of clinical lab test  Review and/or order of tests in the radiology section of CPT  Review and/or order of tests in the medicine section of CPT      Labs:     No results for input(s): WBC, HGB, HCT, PLT, HGBEXT, HCTEXT, PLTEXT, HGBEXT, HCTEXT, PLTEXT in the last 72 hours.   Recent Labs      01/07/17   0251  01/06/17   0259  01/05/17   0228   NA  136  147*  143   K  3.6  4.0  4.0   CL  102  109*  110*   CO2  26  26  29   BUN  11  10  11   CREA  0.58 0. 60  0.47*   GLU  148*  89  86   CA  8.3*  8.5  7.9*   MG   --   1.7   --      No results for input(s): SGOT, GPT, ALT, AP, TBIL, TBILI, TP, ALB, GLOB, GGT, AML, LPSE in the last 72 hours. No lab exists for component: AMYP, HLPSE  No results for input(s): INR, PTP, APTT in the last 72 hours. No lab exists for component: INREXT, INREXT   No results for input(s): FE, TIBC, PSAT, FERR in the last 72 hours. No results found for: FOL, RBCF   No results for input(s): PH, PCO2, PO2 in the last 72 hours. No results for input(s): CPK, CKNDX, TROIQ in the last 72 hours.     No lab exists for component: CPKMB  Lab Results   Component Value Date/Time    Cholesterol, total 148 06/13/2016 04:06 PM    HDL Cholesterol 51 06/13/2016 04:06 PM    LDL, calculated 77.4 06/13/2016 04:06 PM    Triglyceride 98 06/13/2016 04:06 PM    CHOL/HDL Ratio 2.9 06/13/2016 04:06 PM     Lab Results   Component Value Date/Time    Glucose (POC) 171 06/15/2016 11:10 AM     Lab Results   Component Value Date/Time    Color YELLOW/STRAW 01/03/2017 02:55 PM    Appearance TURBID 01/03/2017 02:55 PM    Specific gravity 1.023 01/03/2017 02:55 PM    pH (UA) 7.5 01/03/2017 02:55 PM    Protein NEGATIVE  01/03/2017 02:55 PM    Glucose NEGATIVE  01/03/2017 02:55 PM    Ketone NEGATIVE  01/03/2017 02:55 PM    Bilirubin NEGATIVE  01/03/2017 02:55 PM    Urobilinogen 1.0 01/03/2017 02:55 PM    Nitrites NEGATIVE  01/03/2017 02:55 PM    Leukocyte Esterase NEGATIVE  01/03/2017 02:55 PM    Epithelial cells MODERATE 01/03/2017 02:55 PM    Bacteria NEGATIVE  01/03/2017 02:55 PM    WBC 0-4 01/03/2017 02:55 PM    RBC 5-10 01/03/2017 02:55 PM         Medications Reviewed:     Current Facility-Administered Medications   Medication Dose Route Frequency    dextrose 5% infusion  75 mL/hr IntraVENous CONTINUOUS    magnesium oxide (MAG-OX) tablet 400 mg  400 mg Oral DAILY    potassium chloride (KAON 10%) 20 mEq/15 mL oral liquid 20 mEq  20 mEq Oral DAILY    acetaminophen (TYLENOL) tablet 500 mg  500 mg Oral Q6H PRN    aspirin delayed-release tablet 81 mg  81 mg Oral DAILY    atorvastatin (LIPITOR) tablet 10 mg  10 mg Oral DAILY    cholecalciferol (VITAMIN D3) tablet 1,000 Units  1,000 Units Oral DAILY    levothyroxine (SYNTHROID) tablet 125 mcg  125 mcg Oral ACB    valproic acid (as sodium salt) (DEPAKENE) 250 mg/5 mL (5 mL) oral solution 250 mg  250 mg Oral TID    sodium chloride (NS) flush 5-10 mL  5-10 mL IntraVENous Q8H    sodium chloride (NS) flush 5-10 mL  5-10 mL IntraVENous PRN    enoxaparin (LOVENOX) injection 40 mg  40 mg SubCUTAneous Q24H    hydrALAZINE (APRESOLINE) 20 mg/mL injection 10 mg  10 mg IntraVENous Q6H PRN     ______________________________________________________________________  EXPECTED LENGTH OF STAY: 3d 12h  ACTUAL LENGTH OF STAY:           4 Days               Ila Estevez MD

## 2017-01-07 NOTE — INTERDISCIPLINARY ROUNDS
IDR/SLIDR Summary          Patient: Lavon Ortiz MRN: [id number]    Age: 80 y.o. YOB: 1929 Room/Bed: [facility]   Admit Diagnosis: Altered mental status  Principal Diagnosis: Altered mental status   Goals: Safety, Discharge planning  Readmission: NO  Quality Measure: Not applicable  VTE Prophylaxis: Chemical  Influenza Vaccine screening completed? YES  Pneumococcal Vaccine screening completed? NO  Mobility needs: Yes   Nutrition plan:Yes  Consults:P.T, O.T., Speech and Case Management    Financial concerns:No  Escalated to CM? NO  RRAT Score: 17   Interventions:  Testing due for pt today?  NO  LOS: 4 days Expected length of stay >2 days  Discharge plan: [facility]   PCP: Suhail Ignacio MD  Transportation needs: Yes    Days before discharge:discharge pending  Discharge disposition: Nursing Home    Signed:     Andrae Arambula RN  1/7/2017  4:09 AM

## 2017-01-07 NOTE — PROGRESS NOTES
Bedside shift change report given to JOSE ENRIQUE Werner (oncoming nurse) by Marcie Noel RN (offgoing nurse). Report included the following information SBAR.

## 2017-01-08 NOTE — ROUTINE PROCESS
Bedside and Verbal shift change report given to Debbi RN (oncoming nurse) by Deloris Jerry RN (offgoing nurse). Report included the following information SBAR.

## 2017-01-08 NOTE — PROGRESS NOTES
Hospitalist Progress Note  Hiral Bravo MD  Office: [phone]  Cell: [phone]      Date of Service:  2017  NAME:  Lissette Curran  :  1929  MRN:  [id number]      Admission Summary: This is an 80 y.o lady with a history of HTN, subdural hematoma, left orbital meningioma s/p resection, dementia who resides at a memory care unit. She was brought in by EMS for reported weakness. Family reported she was more lethargic. NH staff reported UE were weaker. Interval history / Subjective:     Lethargic with out sign of distress. Electrolytes are corrected. Assessment & Plan: Altered mental status,possibly acute metabolic encephalopathy due to dehydration,hypernatremia vs seizure  with underlying advanced dementia  -There was concern for stroke given reported of extremity weakness  -MRI brain with prior left pterional craniotomy with no definite change in residual  intraosseous sphenoid wing meningioma, with associated left-sided proptosis. Left temporal lobe encephalomalacia without definite change. Chronic ischemic changes with no acute infarction. Diffuse atrophy. Several other meningiomas and diffuse pachymeningeal enhancement as above. - EEG,epileptoform discharge on the left temporal region match the left encephalomalacia.  -She is on Depakote not for seizure per he son, Dr Rashid Aguirre. Her son noted that she is becoming more lethargic and not able to eat and drink much lately and thinks if Depakote is causing this.   -Discussed with neurologist,recommended to keep the Depakote   -More lethargic last 2 days. Hypernatremia due to ECF contraction:  -Na corrected with D5%    Hypothyroidism,c/w synthroid. Mild hypokalemia: corrected. Code status: DNR  DVT prophylaxis: lovenox    Care Plan discussed with: Nurse  I called and discussed with her son,Dr Rashid Aguirre. Per him she is better but not back to her baseline,she is normally verbal,feeds self and follows commands. She is WC bound. Disposition: TBD Back to admission address . Its likely going to be on Monday given the inclement weather over the weekend. Hospital Problems  Date Reviewed: 6/13/2016          Codes Class Noted POA    * (Principal)Altered mental status ICD-10-CM: R41.82  ICD-9-CM: 780.97  1/3/2017 Yes        Dehydration ICD-10-CM: E86.0  ICD-9-CM: 276.51  1/3/2017 Yes        Hypernatremia ICD-10-CM: E87.0  ICD-9-CM: 276.0  1/3/2017 Yes                Review of Systems:   Review of systems not obtained due to patient factors. Vital Signs:    Last 24hrs VS reviewed since prior progress note. Most recent are:  Visit Vitals    /66    Pulse 94    Temp 99.1 °F (37.3 °C)    Resp 21    Ht 5' 7\" (1.702 m)    Wt 60.2 kg (132 lb 11.2 oz)    SpO2 96%    Breastfeeding No    BMI 20.78 kg/m2         Intake/Output Summary (Last 24 hours) at 01/08/17 1016  Last data filed at 01/08/17 0400   Gross per 24 hour   Intake          3178.75 ml   Output                0 ml   Net          3178.75 ml        Physical Examination:             Constitutional:  Sleepy. ENT:  Left eye bulged out. Resp:  CTA bilaterally. No wheezing/rhonchi/rales. No accessory muscle use   CV:  Regular rhythm, normal rate, no murmurs, gallops, rubs    GI:  Soft, non distended, non tender. normoactive bowel sounds, no hepatosplenomegaly     Musculoskeletal: Pedal edema, SCds on . Neurologic:  AAOx1, CN II-XII reviewed            Data Review:    Review and/or order of clinical lab test  Review and/or order of tests in the radiology section of CPT  Review and/or order of tests in the medicine section of CPT      Labs:     No results for input(s): WBC, HGB, HCT, PLT, HGBEXT, HCTEXT, PLTEXT, HGBEXT, HCTEXT, PLTEXT in the last 72 hours.   Recent Labs      01/07/17   0251  01/06/17   0259   NA  136  147*   K  3.6  4.0   CL  102  109*   CO2  26  26   BUN  11  10   CREA  0.58  0.60   GLU  148*  89   CA 8. 3*  8.5   MG   --   1.7     No results for input(s): SGOT, GPT, ALT, AP, TBIL, TBILI, TP, ALB, GLOB, GGT, AML, LPSE in the last 72 hours. No lab exists for component: AMYP, HLPSE  No results for input(s): INR, PTP, APTT in the last 72 hours. No lab exists for component: INREXT, INREXT   No results for input(s): FE, TIBC, PSAT, FERR in the last 72 hours. No results found for: FOL, RBCF   No results for input(s): PH, PCO2, PO2 in the last 72 hours. No results for input(s): CPK, CKNDX, TROIQ in the last 72 hours.     No lab exists for component: CPKMB  Lab Results   Component Value Date/Time    Cholesterol, total 148 06/13/2016 04:06 PM    HDL Cholesterol 51 06/13/2016 04:06 PM    LDL, calculated 77.4 06/13/2016 04:06 PM    Triglyceride 98 06/13/2016 04:06 PM    CHOL/HDL Ratio 2.9 06/13/2016 04:06 PM     Lab Results   Component Value Date/Time    Glucose (POC) 152 01/07/2017 05:47 PM    Glucose (POC) 171 06/15/2016 11:10 AM     Lab Results   Component Value Date/Time    Color YELLOW/STRAW 01/03/2017 02:55 PM    Appearance TURBID 01/03/2017 02:55 PM    Specific gravity 1.023 01/03/2017 02:55 PM    pH (UA) 7.5 01/03/2017 02:55 PM    Protein NEGATIVE  01/03/2017 02:55 PM    Glucose NEGATIVE  01/03/2017 02:55 PM    Ketone NEGATIVE  01/03/2017 02:55 PM    Bilirubin NEGATIVE  01/03/2017 02:55 PM    Urobilinogen 1.0 01/03/2017 02:55 PM    Nitrites NEGATIVE  01/03/2017 02:55 PM    Leukocyte Esterase NEGATIVE  01/03/2017 02:55 PM    Epithelial cells MODERATE 01/03/2017 02:55 PM    Bacteria NEGATIVE  01/03/2017 02:55 PM    WBC 0-4 01/03/2017 02:55 PM    RBC 5-10 01/03/2017 02:55 PM         Medications Reviewed:     Current Facility-Administered Medications   Medication Dose Route Frequency    dextrose 5% infusion  75 mL/hr IntraVENous CONTINUOUS    magnesium oxide (MAG-OX) tablet 400 mg  400 mg Oral DAILY    potassium chloride (KAON 10%) 20 mEq/15 mL oral liquid 20 mEq  20 mEq Oral DAILY    acetaminophen (TYLENOL) tablet 500 mg  500 mg Oral Q6H PRN    aspirin delayed-release tablet 81 mg  81 mg Oral DAILY    atorvastatin (LIPITOR) tablet 10 mg  10 mg Oral DAILY    cholecalciferol (VITAMIN D3) tablet 1,000 Units  1,000 Units Oral DAILY    levothyroxine (SYNTHROID) tablet 125 mcg  125 mcg Oral ACB    valproic acid (as sodium salt) (DEPAKENE) 250 mg/5 mL (5 mL) oral solution 250 mg  250 mg Oral TID    sodium chloride (NS) flush 5-10 mL  5-10 mL IntraVENous Q8H    sodium chloride (NS) flush 5-10 mL  5-10 mL IntraVENous PRN    enoxaparin (LOVENOX) injection 40 mg  40 mg SubCUTAneous Q24H    hydrALAZINE (APRESOLINE) 20 mg/mL injection 10 mg  10 mg IntraVENous Q6H PRN     ______________________________________________________________________  EXPECTED LENGTH OF STAY: 3d 12h  ACTUAL LENGTH OF STAY:           5 Days               Reyes Blunt, MD

## 2017-01-08 NOTE — PROGRESS NOTES
Bedside and Verbal shift change report given to Jama Reyes RN (oncoming nurse) by Tha Novoa RN (offgoing nurse). Report included the following information SBAR, Kardex, Intake/Output, MAR and Cardiac Rhythm NSR.

## 2017-01-08 NOTE — INTERDISCIPLINARY ROUNDS
IDR/SLIDR Summary          Patient: Lissette Curran MRN: [id number]    Age: 80 y.o. YOB: 1929 Room/Bed: [facility]   Admit Diagnosis: Altered mental status  Principal Diagnosis: Altered mental status   Goals: safety, discharge planning   Readmission: NO  Quality Measure: SEPSIS  VTE Prophylaxis: Chemical  Influenza Vaccine screening completed? YES  Pneumococcal Vaccine screening completed? NO  Mobility needs: Yes   Nutrition plan:Yes  Consults:P.T, O.T., Speech and Case Management    Financial concerns:No  Escalated to CM? YES  RRAT Score: 17   Interventions:  Testing due for pt today?  NO  LOS: 5 days Expected length of stay >2 days  Discharge plan: Nursing Home   PCP: Valeriano Stevens MD  Transportation needs: Yes    Days before discharge:discharge pending  Discharge disposition: Nursing Home    Signed:     Bruce Sanon RN  1/8/2017  1:30 AM

## 2017-01-09 NOTE — PROGRESS NOTES
Spiritual Care Assessment/Progress Notes    Lavon Ortiz [id number]  [id number]    6/22/1929  80 y.o.  female    Patient Telephone Number: [phone] (home)   Lutheran Affiliation: Restorationist   Language: English   Extended Emergency Contact Information  Primary Emergency Contact: Azul Bello  Mobile Phone: [phone]  Relation: Child  Secondary Emergency Contact: Vinny Zuleta  Mobile Phone: [phone]  Relation: Other Relative   Patient Active Problem List    Diagnosis Date Noted    Altered mental status 01/03/2017    Dehydration 01/03/2017    Hypernatremia 01/03/2017    Lethargy 06/13/2016    Frequent falls 05/18/2016    Ankle edema 11/02/2015    Subdural hematoma ([facility] [address] 07/13/2015    Subdural hematoma caused by concussion ([facility] [address] 07/13/2015    Age-related cognitive decline 07/01/2015    Osteopenia, senile 03/29/2015    Hypovitaminosis D 03/29/2015    Pubic ramus fracture ([facility] [address] 03/26/2015    S/P resection of meningioma 05/20/2014    Ocular proptosis 05/20/2014    Acquired hypothyroidism 05/14/2014    Hyperlipidemia 05/14/2014        Date: 1/9/2017       Level of Lutheran/Spiritual Activity:  []         Involved in isma tradition/spiritual practice    []         Not involved in isma tradition/spiritual practice  []         Spiritually oriented    []         Claims no spiritual orientation    []         seeking spiritual identity  []         Feels alienated from Congregational practice/tradition  []         Feels angry about Congregational practice/tradition  []         Spiritua not a resource for coping at this time.   []         Not able to assess due to medical condition    Services Provided Today:  []         crisis intervention    []         reading Scriptures  []         spiritual assessment    []         Prayer for healing / Mishabeirach  []         empathic listening/emotional support  []         rites and rituals (cite in comments)  [x] life review     []         Mormonism support  []         theological development   []         advocacy  []         ethical dialog     []         Psalms for healing / Tehillim  []         bereavement support    []         support to family  []         anticipatory grief support   []         help with AMD  []         spiritual guidance    []         meditation      Spiritual Care Needs  []         Emotional Support  []         Spiritual/Christianity Care  []         Loss/Adjustment  []         Advocacy/Referral /Ethics  [x]         No needs expressed at this time  []         Other: (note in comments)  [address]  []         Follow up visits with pt/family  []         Provide materials  []         Schedule sacraments  []         Contact Community Clergy  [x]         Follow up as needed  []         Other: (note in comments)     Comments: Patient had a lot of trouble conversing. She was able to do head nods and incoherent verbal responses. We talked family and care. Telugu prayer and words of comfort offered. Advised of continuous availability. Nothing further at this time.         Josephine Mora, [facility], Christianity Provider

## 2017-01-09 NOTE — INTERDISCIPLINARY ROUNDS
IDR/SLIDR Summary          Patient: Krissy Jacobo MRN: [id number]    Age: 80 y.o. YOB: 1929 Room/Bed: [facility]   Admit Diagnosis: Altered mental status  Principal Diagnosis: Altered mental status   Goals: safety, discharge planning  Readmission: NO  Quality Measure: SEPSIS  VTE Prophylaxis: Chemical  Influenza Vaccine screening completed? YES  Pneumococcal Vaccine screening completed? NO  Mobility needs: Yes   Nutrition plan:Yes  Consults:P.T, O.T., Speech and Case Management    Financial concerns:No  Escalated to CM? YES  RRAT Score: 20   Interventions:Palliative Care   Testing due for pt today?  NO  LOS: 6 days Expected length of stay >2 days  Discharge plan: Nursing Home   PCP: Vianney Lou MD  Transportation needs: Yes    Days before discharge:two or more days before discharge   Discharge disposition: Nursing Home    Signed:     Radames Primrose  1/9/2017  1:17 AM

## 2017-01-09 NOTE — PROGRESS NOTES
Problem: Dysphagia (Adult)  Goal: *Acute Goals and Plan of Care (Insert Text)  Initiated 1/9/2017  1. Patient will tolerate full liquid diet free of s/s of aspiration within 7 days. SPEECH LANGUAGE PATHOLOGY BEDSIDE SWALLOW EVALUATION  Patient: Lizeth Beltran (4 y.o. female)  Date: 1/9/2017  Primary Diagnosis: Altered mental status        Precautions: fall         ASSESSMENT :  Based on the objective data described below, the patient presents with at least moderate oral and suspected mild pharyngeal dysphagia. Patient appears lethargic, not opening her eyes or following commands, though she does respond to food placed at her lips. Limited assessment given patient refusal of several trials. Sips of water met by delayed acceptance via straw and delayed AP propulsion. Multiple swallows per bolus with mild delay. Coughing noted with and without PO, unable to attribute to aspiration. Patient refused sips of nectar, \"I don't want that\", as well as trials of puree. Her lethargy, dependence for feeding, and lack of safe swallowing skills make solids appear unsafe-deferred today. She is at increased risk for aspiration given dependence for feeding and poor alertness. Apparently, she is able to feed herself at baseline. Patient will benefit from skilled intervention to address the above impairments. Patients rehabilitation potential is considered to be Fair  Factors which may influence rehabilitation potential include:   [ ]            None noted  [X]            Mental ability/status  [X]            Medical condition  [ ]            Home/family situation and support systems  [ ]            Safety awareness  [ ]            Pain tolerance/management  [ ]            Other:        PLAN :  Recommendations and Planned Interventions:  Full liquids-feed only when responding  Upright 90 degrees for all PO and 30 minutes after. Stop feeding if patient indicates she is done, or has a change in respiratory status. Frequency/Duration: Patient will be followed by speech-language pathology 3 times a week to address goals. Discharge Recommendations: To Be Determined       SUBJECTIVE:   Patient stated No I don't want that. OBJECTIVE:       Past Medical History   Diagnosis Date    Hyperlipemia      Hyperlipemia      Hypertension      Hypothyroid      Osteoporosis      Subdural hematoma, acute [facility] [address] July 2015     Past Surgical History   Procedure Laterality Date    Hx other surgical           vocal cord polyp    Hx other surgical           mult meningioma resection     Prior Level of Function/Home Situation:    Home Situation  Home Environment: [address] [place] [address] Name: tone goodwin  # Steps to Enter: 0  One/Two Story Residence: One story  Living Alone: No  Support Systems: Child(kaye)  Patient Expects to be Discharged to[de-identified] Skilled nursing facility  Current DME Used/Available at Home: Lift chair, Hospital bed  Diet prior to admission: regular with thins  Current Diet:  Regular with thins   Cognitive and Communication Status:  Neurologic State: Lethargic  Orientation Level:  (mostly nonverbal)  Cognition: No command following           Oral Assessment:  Oral Assessment  Labial:  (no command following to complete eval)  Dentition: Natural;Limited  Oral Hygiene: dry-oral care completed with green swabs  Lingual:  (no command following)  Mandible: No impairment  P.O. Trials:  Patient Position: upright in bed  Vocal quality prior to P.O.: No impairment  Consistency Presented: Ice chips; Thin liquid  How Presented: Straw     Bolus Acceptance: Impaired (delayed acceptance)  Bolus Formation/Control: Impaired  Type of Impairment: Delayed  Propulsion: Delayed (# of seconds)  Oral Residue: None  Initiation of Swallow: Delayed (# of seconds)  Laryngeal Elevation: Functional  Aspiration Signs/Symptoms:  (coughing both with and without PO trials)  Pharyngeal Phase Characteristics: Double swallow (delay) Oral Phase Severity: Moderate  Pharyngeal Phase Severity : Mild     NOMS:   The NOMS functional outcome measure was used to quantify this patient's level of swallowing impairment. Based on the NOMS, the patient was determined to be at level 3 for swallow function      G Codes: In compliance with CMSs Claims Based Outcome Reporting, the following G-code set was chosen for this patient based the use of the NOMS functional outcome to quantify this patient's level of swallowing impairment. Using the NOMS, the patient was determined to be at level 3 for swallow function which correlates with the CL= 60-79% level of severity. Based on the objective assessment provided within this note, the current, goal, and discharge g-codes are as follows:     Swallow  Swallowing:   Swallow Current Status CL= 60-79%   Swallow Goal Status CK= 40-59%         NOMS Swallowing Levels:  Level 1 (CN): NPO  Level 2 (CM): NPO but takes consistency in therapy  Level 3 (CL): Takes less than 50% of nutrition p.o. and continues with nonoral feedings; and/or safe with mod cues; and/or max diet restriction  Level 4 (CK): Safe swallow but needs mod cues; and/or mod diet restriction; and/or still requires some nonoral feeding/supplements  Level 5 (CJ): Safe swallow with min diet restriction; and/or needs min cues  Level 6 (CI): Independent with p.o.; rare cues; usually self cues; may need to avoid some foods or needs extra time  Level 7 ([address] [place] [address]): Independent for all p.o.  RUDDY. (2003). National Outcomes Measurement System (NOMS): Adult Speech-Language Pathology User's Guide.            Pain:  Pain Scale 1: Adult Nonverbal Pain Scale  Pain Intensity 1: 0     After treatment:   [ ]            Patient left in no apparent distress sitting up in chair  [ ]            Patient left in no apparent distress in bed  [ ]            Call bell left within reach  [ ]            Nursing notified  [ ]            Caregiver present  [ ] Bed alarm activated      COMMUNICATION/EDUCATION:   The patients plan of care including recommendations, planned interventions, and recommended diet changes were discussed with: Registered Nurse. Patient was educated regarding purpose of SLP visit. She did not appear to understand. [ ]            Posted safety precautions in patient's room. [ ]            Patient/family have participated as able in goal setting and plan of care. [X]            Patient/family agree to work toward stated goals and plan of care. [ ]            Patient understands intent and goals of therapy, but is neutral about his/her participation. [X]            Patient is unable to participate in goal setting and plan of care.      Thank you for this referral.  LENIN Salmeron  Time Calculation: 20 mins

## 2017-01-09 NOTE — PROGRESS NOTES
Bedside and Verbal shift change report given to Mansoor Phelps RN (oncoming nurse) by Cierra Sanderson RN (offgoing nurse). Report included the following information SBAR, Kardex, Intake/Output, MAR and Recent Results.

## 2017-01-09 NOTE — PROGRESS NOTES
Bedside shift change report given to Debbi (oncoming nurse) by Duane Oliveros (offgoing nurse). Report included the following information SBAR, Kardex, MAR, Accordion and Recent Results.

## 2017-01-09 NOTE — PROGRESS NOTES
Bedside and Verbal shift change report given to Kamron Chatterjee RN  (oncoming nurse) by Vanesa Esquivel RN (offgoing nurse). Report included the following information SBAR, Kardex, Intake/Output, MAR, Recent Results and Cardiac Rhythm NSR.

## 2017-01-09 NOTE — PROGRESS NOTES
Hospitalist Progress Note  Diane Phillips MD  Office: [phone]        Date of Service:  2017  NAME:  Jerrica Cantrell  :  1929  MRN:  [id number]      Admission Summary: This is an 80 y.o lady with a history of HTN, subdural hematoma, left orbital meningioma s/p resection, dementia who resides at a memory care unit. She was brought in by EMS for reported weakness. Family reported she was more lethargic. NH staff reported UE were weaker. Interval history / Subjective:     Lethargic with out sign of distress. Electrolytes are corrected. Pt does not speak   Not following commands      Assessment & Plan: Altered mental status,possibly acute metabolic encephalopathy due to dehydration,hypernatremia vs seizure  with underlying advanced dementia  -There was concern for stroke given reported of extremity weakness  -MRI brain with prior left pterional craniotomy with no definite change in residual  intraosseous sphenoid wing meningioma, with associated left-sided proptosis. Left temporal lobe encephalomalacia without definite change. Chronic ischemic changes with no acute infarction. Diffuse atrophy. Several other meningiomas and diffuse pachymeningeal enhancement as above. - EEG,epileptoform discharge on the left temporal region match the left encephalomalacia.  -She is on Depakote not for seizure per he son, Dr Mack Herrera. Her son noted that she is becoming more lethargic and not able to eat and drink much lately and thinks if Depakote is causing this.   -Discussed with neurologist,recommended to keep the Depakote   -More lethargic this morning although labs looks OK     Hypernatremia due to ECF contraction:  -Na corrected with D5% na 140     Hypothyroidism,c/w synthroid. Mild hypokalemia: corrected. Code status: DNR  DVT prophylaxis: lovenox    Care Plan discussed with: Nurse   Disposition: TBD Back to admission address .  [facility] Problems  Date Reviewed: 6/13/2016          Codes Class Noted POA    * (Principal)Altered mental status ICD-10-CM: R41.82  ICD-9-CM: 780.97  1/3/2017 Yes        Dehydration ICD-10-CM: E86.0  ICD-9-CM: 276.51  1/3/2017 Yes        Hypernatremia ICD-10-CM: E87.0  ICD-9-CM: 276.0  1/3/2017 Yes                Review of Systems:   Review of systems not obtained due to patient factors. Vital Signs:    Last 24hrs VS reviewed since prior progress note. Most recent are:  Visit Vitals    /62 (BP 1 Location: Right arm, BP Patient Position: At rest)    Pulse 85    Temp 98.2 °F (36.8 °C)    Resp 25    Ht 5' 7\" (1.702 m)    Wt 60.2 kg (132 lb 11.5 oz)    SpO2 96%    Breastfeeding No    BMI 20.79 kg/m2       No intake or output data in the 24 hours ending 01/09/17 6199     Physical Examination:             Constitutional:  awake    ENT:  Left eye bulged out. Resp:  few wheezing    CV:  Regular rhythm, normal rate rubs    GI:  Soft, non distended,  no hepatosplenomegaly     Musculoskeletal: Pedal edema, SCds on . Neurologic:  AAO x 0 does not follow command , non verbal             Data Review:    Review and/or order of clinical lab test  Review and/or order of tests in the radiology section of CPT  Review and/or order of tests in the medicine section of CPT      Labs:     No results for input(s): WBC, HGB, HCT, PLT, HGBEXT, HCTEXT, PLTEXT, HGBEXT, HCTEXT, PLTEXT in the last 72 hours. Recent Labs      01/08/17   1312  01/07/17   0251   NA  140  136   K  4.1  3.6   CL  104  102   CO2  27  26   BUN  9  11   CREA  0.56  0.58   GLU  122*  148*   CA  8.4*  8.3*   MG  2.0   --      No results for input(s): SGOT, GPT, ALT, AP, TBIL, TBILI, TP, ALB, GLOB, GGT, AML, LPSE in the last 72 hours. No lab exists for component: AMYP, HLPSE  No results for input(s): INR, PTP, APTT in the last 72 hours.     No lab exists for component: INREXT, INREXT   No results for input(s): FE, TIBC, PSAT, FERR in the last 72 hours. No results found for: FOL, RBCF   No results for input(s): PH, PCO2, PO2 in the last 72 hours. No results for input(s): CPK, CKNDX, TROIQ in the last 72 hours.     No lab exists for component: CPKMB  Lab Results   Component Value Date/Time    Cholesterol, total 148 06/13/2016 04:06 PM    HDL Cholesterol 51 06/13/2016 04:06 PM    LDL, calculated 77.4 06/13/2016 04:06 PM    Triglyceride 98 06/13/2016 04:06 PM    CHOL/HDL Ratio 2.9 06/13/2016 04:06 PM     Lab Results   Component Value Date/Time    Glucose (POC) 125 01/09/2017 08:04 AM    Glucose (POC) 103 01/08/2017 05:18 PM    Glucose (POC) 152 01/07/2017 05:47 PM    Glucose (POC) 171 06/15/2016 11:10 AM     Lab Results   Component Value Date/Time    Color YELLOW/STRAW 01/08/2017 06:07 PM    Appearance CLEAR 01/08/2017 06:07 PM    Specific gravity <1.005 01/08/2017 06:07 PM    pH (UA) 6.5 01/08/2017 06:07 PM    Protein NEGATIVE  01/08/2017 06:07 PM    Glucose NEGATIVE  01/08/2017 06:07 PM    Ketone NEGATIVE  01/08/2017 06:07 PM    Bilirubin NEGATIVE  01/08/2017 06:07 PM    Urobilinogen 0.2 01/08/2017 06:07 PM    Nitrites NEGATIVE  01/08/2017 06:07 PM    Leukocyte Esterase TRACE 01/08/2017 06:07 PM    Epithelial cells FEW 01/08/2017 06:07 PM    Bacteria 1+ 01/08/2017 06:07 PM    WBC 0-4 01/08/2017 06:07 PM    RBC 0-5 01/08/2017 06:07 PM         Medications Reviewed:     Current Facility-Administered Medications   Medication Dose Route Frequency    dextrose 5% infusion  75 mL/hr IntraVENous CONTINUOUS    magnesium oxide (MAG-OX) tablet 400 mg  400 mg Oral DAILY    potassium chloride (KAON 10%) 20 mEq/15 mL oral liquid 20 mEq  20 mEq Oral DAILY    acetaminophen (TYLENOL) tablet 500 mg  500 mg Oral Q6H PRN    aspirin delayed-release tablet 81 mg  81 mg Oral DAILY    atorvastatin (LIPITOR) tablet 10 mg  10 mg Oral DAILY    cholecalciferol (VITAMIN D3) tablet 1,000 Units  1,000 Units Oral DAILY    levothyroxine (SYNTHROID) tablet 125 mcg  125 mcg Oral ACB    valproic acid (as sodium salt) (DEPAKENE) 250 mg/5 mL (5 mL) oral solution 250 mg  250 mg Oral TID    sodium chloride (NS) flush 5-10 mL  5-10 mL IntraVENous Q8H    sodium chloride (NS) flush 5-10 mL  5-10 mL IntraVENous PRN    enoxaparin (LOVENOX) injection 40 mg  40 mg SubCUTAneous Q24H    hydrALAZINE (APRESOLINE) 20 mg/mL injection 10 mg  10 mg IntraVENous Q6H PRN     ______________________________________________________________________  EXPECTED LENGTH OF STAY: 3d 12h  ACTUAL LENGTH OF STAY:           6 Days               Christine Price MD

## 2017-01-10 NOTE — PROGRESS NOTES
Problem: Dysphagia (Adult)  Goal: *Acute Goals and Plan of Care (Insert Text)  Initiated 1/9/2017  1. Patient will tolerate full liquid diet free of s/s of aspiration within 7 days. [id number] [facility]  Patient: Rissa Barrera (61 y.o. female)  Date: 1/10/2017  Diagnosis: Altered mental status Altered mental status       Precautions: fall        ASSESSMENT:  Patient continues with inconsistent bolus acceptance and manipulation, likely related to lethargy and cognition. Wet/congested cough both with and without PO. Unable to rule out aspiration as a cause. Not appropriate for MBS as she is so inconsistent with her bolus acceptance. Progression toward goals:  [ ]         Improving appropriately and progressing toward goals  [X]         Improving slowly and progressing toward goals  [ ]         Not making progress toward goals and plan of care will be adjusted       PLAN:  Recommendations and Planned Interventions:  Continue full liquids for now. SLP will continue to follow  Feed only when actively accepting  Patient continues to benefit from skilled intervention to address the above impairments. Continue treatment per established plan of care. Discharge Recommendations: To Be Determined       SUBJECTIVE:   Patient nonverbal-opened eyes once      OBJECTIVE:   Cognitive and Communication Status:  Neurologic State: Lethargic  Orientation Level: Oriented to person, Disoriented to time, Disoriented to situation, Disoriented to place  Cognition: No command following           Dysphagia Treatment:  Oral Assessment:  Oral Assessment  Dentition: Natural;Limited  Oral Hygiene: dry  Mandible: No impairment  P.O. Trials:  Patient Position: upright in bed  Vocal quality prior to P.O.:  (no vocalizations)  Consistency Presented: Ice chips; Thin liquid;Pudding  How Presented: Spoon;Straw     Bolus Acceptance: Impaired  Bolus Formation/Control: Impaired  Type of Impairment: Delayed (decreased/inconsistent suck via straw)  Propulsion: Delayed (# of seconds)  Oral Residue: None  Initiation of Swallow: Delayed (# of seconds)                       Oral Phase Severity: Moderate  Pharyngeal Phase Severity : Mild                                                                                                                                      Pain:  Pain Scale 1: Numeric (0 - 10)  Pain Intensity 1: 0     After treatment:   [ ]              Patient left in no apparent distress sitting up in chair  [X]              Patient left in no apparent distress in bed  [X]              Call bell left within reach  [X]              Nursing notified  [ ]              Caregiver present  [ ]              Bed alarm activated      COMMUNICATION/EDUCATION:   Patient was educated regarding purpose of SLP visit. Did not appear to comprehend in any way. The patients plan of care including recommendations, planned interventions, and recommended diet changes were discussed with: Registered Nurse. [ ]              Posted safety precautions in patient's room.      LENIN Monsivais  Time Calculation: 19 mins

## 2017-01-10 NOTE — PROGRESS NOTES
Hospitalist Progress Note  Pedrito Matthew MD  Office: [phone]        Date of Service:  1/10/2017  NAME:  Lauralyn Schwab  :  1929  MRN:  [id number]      Admission Summary: This is an 80 y.o lady with a history of HTN, subdural hematoma, left orbital meningioma s/p resection, dementia who resides at a memory care unit. She was brought in by EMS for reported weakness. Family reported she was more lethargic. NH staff reported UE were weaker. Interval history / Subjective:     Lethargic with out sign of distress. Electrolytes are corrected. Pt does not speak   Not following commands more awake per RN     Assessment & Plan: Altered mental status,possibly acute metabolic encephalopathy due to dehydration,hypernatremia vs seizure  with underlying advanced dementia  -There was concern for stroke given reported of extremity weakness  -MRI brain with prior left pterional craniotomy with no definite change in residual  intraosseous sphenoid wing meningioma, with associated left-sided proptosis. Left temporal lobe encephalomalacia without definite change. Chronic ischemic changes with no acute infarction. Diffuse atrophy. Several other meningiomas and diffuse pachymeningeal enhancement as above. - EEG,epileptoform discharge on the left temporal region match the left encephalomalacia.  -She is on Depakote not for seizure per he son, Dr Ashly Casper. Her son noted that she is becoming more lethargic and not able to eat and drink much lately and thinks if Depakote is causing this. Will d/c Depakote 1/10  -Discussed with neurologist,recommended to keep the Depakote but can try without it     Hypernatremia due to ECF contraction:  -Na corrected with D5% na 140     Hypothyroidism,c/w synthroid. Mild hypokalemia: corrected.     Repeat CXR and give a dose of lasix      Code status: DNR  DVT prophylaxis: lovenox    Care Plan discussed with: Nurse Disposition: TBD Back to admission address . [facility] Problems  Date Reviewed: 6/13/2016          Codes Class Noted POA    * (Principal)Altered mental status ICD-10-CM: R41.82  ICD-9-CM: 780.97  1/3/2017 Yes        Dehydration ICD-10-CM: E86.0  ICD-9-CM: 276.51  1/3/2017 Yes        Hypernatremia ICD-10-CM: E87.0  ICD-9-CM: 276.0  1/3/2017 Yes                Review of Systems:   Review of systems not obtained due to patient factors. Vital Signs:    Last 24hrs VS reviewed since prior progress note. Most recent are:  Visit Vitals    /60    Pulse 93    Temp 97.7 °F (36.5 °C)    Resp 14    Ht 5' 7\" (1.702 m)    Wt 57.2 kg (126 lb 1.6 oz)    SpO2 94%    Breastfeeding No    BMI 19.75 kg/m2       No intake or output data in the 24 hours ending 01/10/17 0912     Physical Examination:             Constitutional:  awake    ENT:  Left eye bulged out. Resp:  few wheezing    CV:  Regular rhythm, normal rate rubs    GI:  Soft, non distended,  no hepatosplenomegaly     Musculoskeletal: Pedal edema, SCds on . Neurologic:  AAO x 0 does not follow command , non verbal             Data Review:    Review and/or order of clinical lab test  Review and/or order of tests in the radiology section of CPT  Review and/or order of tests in the medicine section of CPT      Labs:     No results for input(s): WBC, HGB, HCT, PLT, HGBEXT, HCTEXT, PLTEXT, HGBEXT, HCTEXT, PLTEXT in the last 72 hours. Recent Labs      01/08/17   1312   NA  140   K  4.1   CL  104   CO2  27   BUN  9   CREA  0.56   GLU  122*   CA  8.4*   MG  2.0     No results for input(s): SGOT, GPT, ALT, AP, TBIL, TBILI, TP, ALB, GLOB, GGT, AML, LPSE in the last 72 hours. No lab exists for component: AMYP, HLPSE  No results for input(s): INR, PTP, APTT in the last 72 hours. No lab exists for component: INREXT, INREXT   No results for input(s): FE, TIBC, PSAT, FERR in the last 72 hours.    No results found for: FOL, RBCF   No results for input(s): PH, PCO2, PO2 in the last 72 hours. No results for input(s): CPK, CKNDX, TROIQ in the last 72 hours.     No lab exists for component: CPKMB  Lab Results   Component Value Date/Time    Cholesterol, total 148 06/13/2016 04:06 PM    HDL Cholesterol 51 06/13/2016 04:06 PM    LDL, calculated 77.4 06/13/2016 04:06 PM    Triglyceride 98 06/13/2016 04:06 PM    CHOL/HDL Ratio 2.9 06/13/2016 04:06 PM     Lab Results   Component Value Date/Time    Glucose (POC) 113 01/09/2017 07:02 PM    Glucose (POC) 121 01/09/2017 11:52 AM    Glucose (POC) 125 01/09/2017 08:04 AM    Glucose (POC) 103 01/08/2017 05:18 PM    Glucose (POC) 152 01/07/2017 05:47 PM     Lab Results   Component Value Date/Time    Color YELLOW/STRAW 01/08/2017 06:07 PM    Appearance CLEAR 01/08/2017 06:07 PM    Specific gravity <1.005 01/08/2017 06:07 PM    pH (UA) 6.5 01/08/2017 06:07 PM    Protein NEGATIVE  01/08/2017 06:07 PM    Glucose NEGATIVE  01/08/2017 06:07 PM    Ketone NEGATIVE  01/08/2017 06:07 PM    Bilirubin NEGATIVE  01/08/2017 06:07 PM    Urobilinogen 0.2 01/08/2017 06:07 PM    Nitrites NEGATIVE  01/08/2017 06:07 PM    Leukocyte Esterase TRACE 01/08/2017 06:07 PM    Epithelial cells FEW 01/08/2017 06:07 PM    Bacteria 1+ 01/08/2017 06:07 PM    WBC 0-4 01/08/2017 06:07 PM    RBC 0-5 01/08/2017 06:07 PM         Medications Reviewed:     Current Facility-Administered Medications   Medication Dose Route Frequency    dextrose 5% infusion  75 mL/hr IntraVENous CONTINUOUS    magnesium oxide (MAG-OX) tablet 400 mg  400 mg Oral DAILY    potassium chloride (KAON 10%) 20 mEq/15 mL oral liquid 20 mEq  20 mEq Oral DAILY    acetaminophen (TYLENOL) tablet 500 mg  500 mg Oral Q6H PRN    aspirin delayed-release tablet 81 mg  81 mg Oral DAILY    atorvastatin (LIPITOR) tablet 10 mg  10 mg Oral DAILY    cholecalciferol (VITAMIN D3) tablet 1,000 Units  1,000 Units Oral DAILY    levothyroxine (SYNTHROID) tablet 125 mcg  125 mcg Oral ACB    sodium chloride (NS) flush 5-10 mL  5-10 mL IntraVENous Q8H    sodium chloride (NS) flush 5-10 mL  5-10 mL IntraVENous PRN    enoxaparin (LOVENOX) injection 40 mg  40 mg SubCUTAneous Q24H    hydrALAZINE (APRESOLINE) 20 mg/mL injection 10 mg  10 mg IntraVENous Q6H PRN     ______________________________________________________________________  EXPECTED LENGTH OF STAY: 3d 12h  ACTUAL LENGTH OF STAY:           7 Days               Asha Cox MD

## 2017-01-10 NOTE — PROGRESS NOTES
Bedside and Verbal shift change report given to Levonnmarylu Friday, RN (oncoming nurse) by Josy Graham RN (offgoing nurse). Report included the following information SBAR, Kardex, Intake/Output, MAR and Recent Results.

## 2017-01-10 NOTE — PROGRESS NOTES
Bedside and Verbal shift change report given to [address] [facility] [address] (oncoming nurse) by Damaris Toney (offgoing nurse). Report included the following information SBAR, Kardex and Recent Results.

## 2017-01-10 NOTE — PROGRESS NOTES
NUTRITION COMPLETE ASSESSMENT    RECOMMENDATIONS:   1. Assist with feeding when awake/alert - document % meals consumed in flowsheet - use Magic Cup to give meds  2. Add bowel regimen - no BM documented since admit (10 days)  3. Add daily MVI + iron - takes PTA and poor PO     Interventions/Plan:   Food/Nutrient Delivery:    Commercial supplement (Magic Cup TID, Ensure daily) Feeding assistance at meals    (Consider nutrition support- if consistent w/ pt/family goals)  Assessment:   Reason for Assessment:   [x]LOS   [x]At Nutrition Risk    Diet: Full liquids  Supplements: none  Nutritionally Significant Medications: [x] Reviewed & Includes: vit D3, synthroid, mag-ox, KCl, D5 @75ml/hr  Meal Intake: No data found. Subjective:  Lethargic, not speaking today. Sleeping during visit, no family present in room. Objective:  Pt admitted for AMS from memory care unit. PMHx: HTN, hyperlipidemia, SDH, advanced dementia, meningioma s/p resection. Continues to be lethargic today. Of note: no BM documented since admit, consider adding bowel regimen. Seen by SLP with dysphagia - recommending full liquids only with poor bolus acceptance and manipulation. On regular diet PTA. Appetite poor per RN documentation. Only bites on yogurt eaten at breakfast and no lunch per tray observation. Intake inadequate but with advanced age and dementia would not recommend aggressive nutrition intervention (PEG) but will follow for pt care plans/goals. For now will add Magic Cup TID (please use to give meds) and Ensure daily. Discussed with RN. If 100% consumed will provide: 1220kcal, 47g protein. Wt has been relatively stable over past 6 months, however 2+ BLE noted. Wt fluctuating between 57-62kg this admit. BMI WNL.   Wt Readings from Last 3 Encounters:   01/10/17 57.2 kg (126 lb 1.6 oz)   06/13/16 58.7 kg (129 lb 4.8 oz)   12/30/15 69.6 kg (153 lb 6.4 oz)     Estimated Nutrition Needs:   Kcals/day: 5054 CMZJQ/FGK (1238-1340kcal)  Protein: 63 g (63-74g (1.1-1.3g/kg))  Fluid: 1500 ml  Based On: Rebecca Duggan (x 1.2-1.3)  Weight Used: Actual wt (57.2kg)    Pt expected to meet estimated nutrient needs:  []   Yes     [x]  No [] Unable to predict at this time  Nutrition Diagnosis:   1. Inadequate oral food/beverage intake related to swallowing difficulty; AMS as evidenced by lethargy; less than 25% meals consumed; full liquids per SLP    Goals:     Consumption of at least 25% of meals and 2-3 supplements per day in 5-7 days     Monitoring & Evaluation:    - Total energy intake, Liquid meal replacement, Oral fluids amount   - Weight/weight change   -      Previous Nutrition Goals Met:N/A  Previous Recommendations:    N/A    Education & Discharge Needs:   [x] None Identified   [] Identified and addressed    [] Participated in care plan, discharge planning, and/or interdisciplinary rounds        Cultural, Druze and ethnic food preferences identified:   None    Skin Integrity: [x]Intact  []Other  Edema: []None [x]Other: 2+BLE  Last BM: none documented  Food Allergies: [x]None []Other  Diet Restrictions: Cultural/Druze Preference(s): None     Anthropometrics:    Weight Loss Metrics 1/10/2017 6/13/2016 2/19/2016 12/30/2015 11/2/2015 7/17/2015 7/13/2015   Today's Wt 126 lb 1.6 oz 129 lb 4.8 oz - 153 lb 6.4 oz 169 lb 136 lb 6.4 oz -   BMI 19.75 kg/m2 22.18 kg/m2 - 24.77 kg/m2 - - 22.03 kg/m2      Weight Source: Bed  Height: 5' 7\" (170.2 cm),    Body mass index is 19.75 kg/(m^2).   IBW : 61.2 kg (135 lb), % IBW (Calculated): 93.41 %  Usual Body Weight: 58.5 kg (129 lb),      Labs:    Lab Results   Component Value Date/Time    Sodium 140 01/08/2017 01:12 PM    Potassium 4.1 01/08/2017 01:12 PM    Chloride 104 01/08/2017 01:12 PM    CO2 27 01/08/2017 01:12 PM    Glucose 122 01/08/2017 01:12 PM    BUN 9 01/08/2017 01:12 PM    Creatinine 0.56 01/08/2017 01:12 PM    Calcium 8.4 01/08/2017 01:12 PM    Magnesium 2.0 01/08/2017 01:12 PM Phosphorus 2.2 01/04/2017 05:07 AM    Albumin 3.0 01/03/2017 02:55 PM     Briana Muller RD

## 2017-01-11 NOTE — INTERDISCIPLINARY ROUNDS
IDR/SLIDR Summary          Patient: Rommel [facility] MRN: [id number]    Age: 80 y.o. YOB: 1929 Room[facility]Bed: [facility]   Admit Diagnosis: Altered mental status  Principal Diagnosis: Altered mental status   Goals:   Readmission: NO  Quality Measure: PNA  VTE Prophylaxis: Mechanical and Chemical  Influenza Vaccine screening completed? YES  Pneumococcal Vaccine screening completed? NO  Mobility needs: Yes   Nutrition plan:Yes  Consults:P.T, O.T., Speech, Respiratory and Case Management    Financial concerns:No  Escalated to CM? YES  RRAT Score:    Interventions:H2H  Testing due for pt today?  NO  LOS: 8 days Expected length of stay  days  Discharge plan:    PCP: Fany Damon MD  Transportation needs: Yes    Days before discharge:one day until discharge   Discharge disposition: Nursing Home    Signed:     Shun Flores RN  1/11/2017  12:43 PM

## 2017-01-11 NOTE — PROGRESS NOTES
Problem: Dysphagia (Adult)  Goal: *Acute Goals and Plan of Care (Insert Text)  Initiated 1/9/2017  1. Patient will tolerate full liquid diet free of s/s of aspiration within 7 days. [id number] [address] [facility] TREAT[facility]  Patient: Lauralyn Schwab (89 y.o. female)  Date: 1/11/2017  Diagnosis: Altered mental status Altered mental status       Precautions: fall        ASSESSMENT:  Patient continues to be lethargic and not verbally responsive. She does actively move her mouth in response to oral stimuli. Delayed AP propulsion and pharyngeal swallow initiation. Overt wet coughing after 5 bites and increased time. Unable to correlate this coughing to aspiration given baseline coughing as well. Her risk for aspiration is high given dependence for feeding, decreased alertness. Risk for complications of aspiration due to dependence for oral care, immobility. MBS would likely not be helpful given limited intake and inconsistent refusal of PO. She has had minimal intake in the past few days, per RN. ? Goals of care? Progression toward goals:  [ ]         Improving appropriately and progressing toward goals  [X]         Improving slowly and progressing toward goals  [ ]         Not making progress toward goals and plan of care will be adjusted       PLAN:  Recommendations and Planned Interventions:  Aspiration risk is high. She is already on a relatively restrictive diet. Would Palliative care be beneficial?   Patient continues to benefit from skilled intervention to address the above impairments. Continue treatment per established plan of care. Discharge Recommendations: To Be Determined       SUBJECTIVE:   Patient nonverbal throughout and did not communicate in any way.       OBJECTIVE:   Cognitive and Communication Status:  Neurologic State: Lethargic, Eyes do not open to any stimulus  Orientation Level: Unable to verbalize  Cognition: No command following           Dysphagia Treatment:  Oral Assessment: P.O. Trials:  Patient Position: upright in bed  Vocal quality prior to P.O.:  (nonverbal today)  Consistency Presented: Pudding  How Presented: SLP-fed/presented;Spoon     Bolus Acceptance: Impaired  Bolus Formation/Control: Impaired  Type of Impairment: Delayed  Propulsion: Delayed (# of seconds)  Oral Residue: None  Initiation of Swallow: Delayed (# of seconds)  Laryngeal Elevation: Functional  Aspiration Signs/Symptoms:  (strong cough after 5 bites of puree)                 Oral Phase Severity: Moderate  Pharyngeal Phase Severity : Mild                                                                                                                                      Pain:  Pain Scale 1: Adult Nonverbal Pain Scale  Pain Intensity 1: 0     After treatment:   [ ]              Patient left in no apparent distress sitting up in chair  [X]              Patient left in no apparent distress in bed  [X]              Call bell left within reach  [X]              Nursing notified  [ ]              Caregiver present  [ ]              Bed alarm activated      COMMUNICATION/EDUCATION:      The patients plan of care including recommendations, planned interventions, and recommended diet changes were discussed with: Registered Nurse. [ ]              Posted safety precautions in patient's room.      LENIN Teran  Time Calculation: 13 mins

## 2017-01-11 NOTE — PROGRESS NOTES
Patient suctioned via Oral and Nasal : patient was unable to tolerate oral path and continuously bit suction catheter. New catheter used to suction nasal path. Pt tolerated a bit better with small thick white secretions obtained. Patient has weak cogested cough, is able to produce sputum but seems to swallow what is produced as not much is suctioned using yankaeur.

## 2017-01-11 NOTE — PROGRESS NOTES
Hospitalist Progress Note  Greg Hughes MD  Office: [phone]        Date of Service:  2017  NAME:  Janay Levy  :  1929  MRN:  [id number]      Admission Summary: This is an 80 y.o lady with a history of HTN, subdural hematoma, left orbital meningioma s/p resection, dementia who resides at a memory care unit. She was brought in by EMS for reported weakness. Family reported she was more lethargic. NH staff reported UE were weaker. Interval history / Subjective:     Lethargic with out sign of distress. Electrolytes are corrected. Pt does not speak   Not following commands more awake depakote stopped      Assessment & Plan: Altered mental status,possibly acute metabolic encephalopathy due to dehydration,hypernatremia vs seizure  with underlying advanced dementia  -There was concern for stroke given reported of extremity weakness  -MRI brain with prior left pterional craniotomy with no definite change in residual  intraosseous sphenoid wing meningioma, with associated left-sided proptosis. Left temporal lobe encephalomalacia without definite change. Chronic ischemic changes with no acute infarction. Diffuse atrophy. Several other meningiomas and diffuse pachymeningeal enhancement as above. - EEG,epileptoform discharge on the left temporal region match the left encephalomalacia.  -She is on Depakote not for seizure , stopped fo sedation Dr Rosalinda Nguyen. Her son noted that she is becoming more lethargic and not able to eat and drink much lately and thinks if Depakote is causing this. Will d/c Depakote 1/10  -Discussed with neurologist,recommended to keep the Depakote but can try without it     Hypernatremia due to ECF contraction:  -Na corrected with D5% na 140     Hypothyroidism,c/w synthroid. Mild hypokalemia: corrected.     Repeat CXR - minimal basilar atelectasis 1/10         Code status: DNR  DVT prophylaxis: lovenox    Care Plan discussed with: Nurse   Disposition: TBD Back to admission address . [facility] may need SNF care d/w [facility] Problems  Date Reviewed: 6/13/2016          Codes Class Noted POA    * (Principal)Altered mental status ICD-10-CM: R41.82  ICD-9-CM: 780.97  1/3/2017 Yes        Dehydration ICD-10-CM: E86.0  ICD-9-CM: 276.51  1/3/2017 Yes        Hypernatremia ICD-10-CM: E87.0  ICD-9-CM: 276.0  1/3/2017 Yes                Review of Systems:   Review of systems not obtained due to patient factors. Vital Signs:    Last 24hrs VS reviewed since prior progress note. Most recent are:  Visit Vitals    /52 (BP 1 Location: Left arm, BP Patient Position: At rest)    Pulse 92    Temp 98.7 °F (37.1 °C)    Resp 18    Ht 5' 7\" (1.702 m)    Wt 58 kg (127 lb 12.8 oz)    SpO2 92%    Breastfeeding No    BMI 20.02 kg/m2       No intake or output data in the 24 hours ending 01/11/17 1139     Physical Examination:             Constitutional:  awake    ENT:  Left eye bulged out. Resp:  few wheezing    CV:  Regular rhythm, normal rate rubs    GI:  Soft, non distended,  no hepatosplenomegaly     Musculoskeletal: Pedal edema, SCds on . Neurologic:  AAO x 0 does not follow command , non verbal             Data Review:    Review and/or order of clinical lab test  Review and/or order of tests in the radiology section of CPT  Review and/or order of tests in the medicine section of CPT      Labs:     Recent Labs      01/11/17   0247   WBC  6.4   HGB  10.9*   HCT  34.2*   PLT  296     Recent Labs      01/11/17   0247  01/08/17   1312   NA  138  140   K  3.8  4.1   CL  101  104   CO2  30  27   BUN  8  9   CREA  0.51*  0.56   GLU  116*  122*   CA  7.8*  8.4*   MG   --   2.0     No results for input(s): SGOT, GPT, ALT, AP, TBIL, TBILI, TP, ALB, GLOB, GGT, AML, LPSE in the last 72 hours. No lab exists for component: AMYP, HLPSE  No results for input(s): INR, PTP, APTT in the last 72 hours.     No lab exists for component: INREXT, INREXT   No results for input(s): FE, TIBC, PSAT, FERR in the last 72 hours. No results found for: FOL, RBCF   No results for input(s): PH, PCO2, PO2 in the last 72 hours. No results for input(s): CPK, CKNDX, TROIQ in the last 72 hours.     No lab exists for component: CPKMB  Lab Results   Component Value Date/Time    Cholesterol, total 148 06/13/2016 04:06 PM    HDL Cholesterol 51 06/13/2016 04:06 PM    LDL, calculated 77.4 06/13/2016 04:06 PM    Triglyceride 98 06/13/2016 04:06 PM    CHOL/HDL Ratio 2.9 06/13/2016 04:06 PM     Lab Results   Component Value Date/Time    Glucose (POC) 120 01/10/2017 04:57 PM    Glucose (POC) 140 01/10/2017 11:59 AM    Glucose (POC) 113 01/09/2017 07:02 PM    Glucose (POC) 121 01/09/2017 11:52 AM    Glucose (POC) 125 01/09/2017 08:04 AM     Lab Results   Component Value Date/Time    Color YELLOW/STRAW 01/08/2017 06:07 PM    Appearance CLEAR 01/08/2017 06:07 PM    Specific gravity <1.005 01/08/2017 06:07 PM    pH (UA) 6.5 01/08/2017 06:07 PM    Protein NEGATIVE  01/08/2017 06:07 PM    Glucose NEGATIVE  01/08/2017 06:07 PM    Ketone NEGATIVE  01/08/2017 06:07 PM    Bilirubin NEGATIVE  01/08/2017 06:07 PM    Urobilinogen 0.2 01/08/2017 06:07 PM    Nitrites NEGATIVE  01/08/2017 06:07 PM    Leukocyte Esterase TRACE 01/08/2017 06:07 PM    Epithelial cells FEW 01/08/2017 06:07 PM    Bacteria 1+ 01/08/2017 06:07 PM    WBC 0-4 01/08/2017 06:07 PM    RBC 0-5 01/08/2017 06:07 PM         Medications Reviewed:     Current Facility-Administered Medications   Medication Dose Route Frequency    dextrose 5% infusion  75 mL/hr IntraVENous CONTINUOUS    magnesium oxide (MAG-OX) tablet 400 mg  400 mg Oral DAILY    potassium chloride (KAON 10%) 20 mEq/15 mL oral liquid 20 mEq  20 mEq Oral DAILY    acetaminophen (TYLENOL) tablet 500 mg  500 mg Oral Q6H PRN    aspirin delayed-release tablet 81 mg  81 mg Oral DAILY    atorvastatin (LIPITOR) tablet 10 mg  10 mg Oral DAILY  cholecalciferol (VITAMIN D3) tablet 1,000 Units  1,000 Units Oral DAILY    levothyroxine (SYNTHROID) tablet 125 mcg  125 mcg Oral ACB    sodium chloride (NS) flush 5-10 mL  5-10 mL IntraVENous Q8H    sodium chloride (NS) flush 5-10 mL  5-10 mL IntraVENous PRN    enoxaparin (LOVENOX) injection 40 mg  40 mg SubCUTAneous Q24H    hydrALAZINE (APRESOLINE) 20 mg/mL injection 10 mg  10 mg IntraVENous Q6H PRN     ______________________________________________________________________  EXPECTED LENGTH OF STAY: 3d 12h  ACTUAL LENGTH OF STAY:           8 Days               Og Jackson MD

## 2017-01-11 NOTE — PROGRESS NOTES
Cm continuing to follow for transitions of care. The discharge plan remains the same--patient will return to [facility] care once stable.   [employer] , [employer] Inventory Management System

## 2017-01-11 NOTE — PROGRESS NOTES
Bedside shift change report given to [address] [address] (oncoming nurse) by Carmela Gatica RN (offgoing nurse).  Report included the following information SBAR, Kardex, Procedure Summary, Intake/Output, MAR, Recent Results and Cardiac Rhythm SR.

## 2017-01-12 PROBLEM — A41.9 SEPSIS (HCC): Status: ACTIVE | Noted: 2017-01-01

## 2017-01-12 NOTE — PROGRESS NOTES
Patient began gurgling around 1800. Patient's lungs have been very coarse today. MD paged. Orders received. PRN breathing txs and deep suctioning. Respiratory paged. 1845: Post respiratory tx patient's O2 saturation has declined to 88% and patient's RR has increased to 30 breaths per minute. 2L NC placed on patient. Patient's O2 hanging around 90%. Respiratory paged for insight. Respiratory says to continue to monitor and just keep patient above 90%. 1855: See docflow for VS. MEWS of 7. Temp of 101.5 axillary. This writer is going to change patient's brief and turn patient to assist in coughing up sputum. 1900: Patient coughed up sputum that was retrieved with yaunker at bedside. Patient's O2 saturation is now 94% on 2L NC.      1910: Patient's temp is now 100.9 axillary. Will continue to monitor. Patient seems to be stabilizing post deep suctioning. MEWS now 4. Bedside shift change report given to [address] (oncoming nurse) by Saritha Williamson RN (offgoing nurse). Report included the following information SBAR, Kardex, Procedure Summary, Intake/Output, MAR, Recent Results and Cardiac Rhythm SR/ST.

## 2017-01-12 NOTE — WOUND CARE
WOCN Note:     Follow-up visit for left hip. Chart shows:  Admitted for AMS & dehydration; history of pubic ramus fracture, meningioma resection, subdural hematoma, falls  Admitted from memory care unit     Assessment:   Patient moans only. Full assist for turning. Bed: advance with bed alarm  Patient wearing briefs for incontinence. Patient reports no pain. All areas are present on admission.     Bilateral heels, sacrum, buttocks, and hips all intact and without erythema.     Patient repositioned on left side with pillow between the knees. Heels offloaded in Prevalon boots. Recommendations:    Prevlaon boots while in bed.      Skin Care & Pressure Prevention:  1. Minimize layers of linen/pads under patient to optimize support surface. 2. Turn/reposition approximately every 2 hours. 3. Manage incontinence; Aloe Vesta to buttocks for skin integrity. Discussed above plan with RN. Transition of Care: Will sign off.      ANA Cantu, RN, [facility] [place]  Certified Wound, Ostomy, Continence Nurse  office [phone]  pager [phone] or call  to page

## 2017-01-12 NOTE — PROGRESS NOTES
01/12/17 0330   Vitals   Temp (!) 100.8 °F (38.2 °C)   Temp Source Rectal   Pulse (Heart Rate) 98   Heart Rate Source Monitor   Resp Rate 28   O2 Sat (%) 93 %   Level of Consciousness (!) Responds to Pain   /61   MAP (Calculated) 90   BP 1 Location Right leg   BP 1 Method Automatic   BP Patient Position At rest   Cardiac Rhythm NSR   MEWS Score 4   Pain 1   Pain Scale 1 Adult Nonverbal Pain Scale   Pain Intensity 1 2   Adult Nonverbal Pain Scale   Face 0   Activity (Movement) 0   Guarding 2   Physiology (Vital Signs) 0   Respiratory 0   Total Score 2   Oxygen Therapy   O2 Device Nasal cannula   O2 Flow Rate (L/min) 3 l/min   Airway Clearance   Suction Oral   Sputum Amount Moderate   Sputum Color/Odor Green;Yellow   Sputum Consistency Thick   Patient Observation   Hourly Rounds Yes  (Q1 hr rounds)   Height/Weight   Weight 56.2 kg (123 lb 14 oz)   Weight Source Bed     MD updated on pt condition.

## 2017-01-12 NOTE — PALLIATIVE CARE
Palliative Medicine Social Work    Ms. Griselda Christian is an 80year old woman with a history significant for HTN, multiple meningiomas, left orbital meningioma s/p resection, SDH and dementia. She was admitted from [facility], [facility] at [facility] on 1/3/16 for acute mental and functional changes, as she was unable to lift her utensils at lunch. Per chart, staff fed her then sent her to ED. No acute changes found on MRI of brain, but diffuse atrophy and multiple meningiomas noted. There was some question about whether her Depakote may be causing symptoms, but patient found to have subtherapeutic level. Altered state felt to be a result of dehydration, hypernatremia vs. Seizure. Patient evaluated by SLP and found to be a high risk for aspiration and full liquid diet recommended only when patient accepting. Of late, staff in facility have noted decreased intake and pocketing of food. At baseline, patient was verbal and able to follow simple commands and feed herself. She was wheelchair-bound and transferred by guillermo lift. On 1/11/16, patient presented with profound gurgling, decreased O2 sats, tachycardia and fever felt to be aspiration PNA. Palliative Medicine consulted to discuss care goals. Patient has AMD scanned into record which appoints her two sons, Katlyn Nieves ([phone]) and Aydee Flores ([phone]) as her healthcare agents. She also directed for comfort care in the context of terminal illness or imminent death. Sons already agreed to DNR. I met with Luther Weir and his daughter this date to introduce services and talk more about how we may assist.  Noted while in the room that patient was having  and rattling respirations with periods of apnea. Family was aware of her high risk for aspiration and a recommendation for comfort care. Son had questions about her nutrition and care back at [facility]. He confirmed patient would not want PEG.   Talked to family about symptoms I was seeing and belief that she was actively dying now. Talked about goals of care in this context: that we would treat symptoms of pain, shortness of breath and anxiety only. Informed we would not continue with antibiotics or fluids and provided the rationale. Family not aware she had developed PNA or that she was dying, but accepting of this news. Son confident about goals for comfort and pleased that her care may be able to continue here in the hospital.  Discussed plan for Palliative NP to come and assess as well as [facility]. Myrna Murphy was planning to call his brother and other family to inform. Patient is  and the mother of two. She is a retired  from MD.  She reportedly had a good quality of life until about 2 years ago when her dementia became advanced prompting her move to facility. Communicated information to nursing and care manager. Thank you for the opportunity to be involved in the care of Ms. Mack Herrera. Lillian Ross, NAMANW, [facility]-  Palliative Medicine   Respecting Choices ® ACP Facilitator   [phone]

## 2017-01-12 NOTE — PROGRESS NOTES
01/12/17 0622   Vitals   Temp (!) 100.5 °F (38.1 °C)   Temp Source Rectal   Pulse (Heart Rate) 95   Heart Rate Source Monitor   Resp Rate 28   O2 Sat (%) 98 %   Level of Consciousness Responds to Voice   /63   MAP (Calculated) 92   BP 1 Location Right leg   BP 1 Method Automatic   BP Patient Position At rest   Cardiac Rhythm NSR   MEWS Score 3   Box Number [id number]   Electrodes Replaced No   Pain 1   Pain Scale 1 Adult Nonverbal Pain Scale   Pain Intensity 1 2   Adult Nonverbal Pain Scale   Face 0   Activity (Movement) 0   Guarding 2   Physiology (Vital Signs) 0   Respiratory 0   Total Score 2   Oxygen Therapy   O2 Device Nasal cannula   O2 Flow Rate (L/min) 3 l/min   Airway Clearance   Suction Oral  (Respiratory called for deep suctioning)   Sputum Amount Small   Sputum Color/Odor Green;Yellow   Sputum Consistency Thick   Patient Observation   Patient Turned Turn on left side   Hourly Rounds Yes  (Q1 hr rounds)     MD paged.

## 2017-01-12 NOTE — HOSPICE
[facility] RN NOTE    Per referral received:    Reviewed case with palliative care MD, Dr Joyce Márquez, reveiwed chart and visited room. Patient is eligible at this time for inpatient hospice. Hospice is planning to meet with MPOA in about 45 minutes per MPOA request for that meeting time.       Thank you for the opportunity    Hortensia Barrow RN BSN [facility]  Office [phone]

## 2017-01-12 NOTE — PROGRESS NOTES
Speech Therapy    Chart reviewed and spoke with RN. Note patient is now febrile. She has been requiring deep suctioning for poor secretion management. Scopolamine patch ordered. She does not appear safe for PO intake at this time. Palliative care consulted. Please hold PO, pending goals of care. Will follow along with you. Thank you. Christen A. Collette Spar, M.S., CCC-SLP

## 2017-01-12 NOTE — PALLIATIVE CARE
Palliative Medicine Social Work      I received a call from patient's son, Kimberly Rodríguez. He states he spoke to his brother about his mother's condition, but wanted to make sure he had a good understanding. Explained we had been consulted to discuss care goals in the context of her altered state and high risk for aspiration. I explained that in between the time that attending had spoken to family about her risk for aspiration and recommendation for hospice, she seems to have developed aspiration PNA. I further explained that when I saw her, she appeared to be actively dying and not likely to survive this admission. Yue Dadds struggling to make sense out of her kemp decline. He states they had talked weeks ago about preparing for her end-of-life, were informed she likely had months, thought she was improving on this admission, now just hoping he can get to [place] before she dies. Talked more about the likelihood she was aspirating over the last few days, weeks, months without marcella signs. He recalled the death of his father under similar circumstances. He is flying into [facility]. He tells me he is in complete agreement with goals for comfort only and hospice care and that his brother, Laura Washburn, has full authority for all decisions. Thank you for the opportunity to be involved in the care of Ms. Mando Mayfield. Lillian Ross, COLEMAN, [facility]  Palliative Medicine   Respecting Choices ® ACP Facilitator   [phone]

## 2017-01-12 NOTE — HOSPICE
[facility] MSW note:    Consult received, chart reviewed, in to meet patient and family for hospice consult. Daughter-in-law was at bedside. Hospice team will be meeting with [facility] at 4:45 pm to offer in patient admission, per hospice RN patient is inpatient appropriate. Thank-you for this referral and the opportunity to be involved in this patients care.     [address] [place] [address]  [phone]
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
## 2017-01-12 NOTE — PROGRESS NOTES
Hospitalist Progress Note  Aron Ace MD  Office: [phone]        Date of Service:  2017  NAME:  Rommel Costello  :  1929  MRN:  [id number]      Admission Summary: This is an 80 y.o lady with a history of HTN, subdural hematoma, left orbital meningioma s/p resection, dementia who resides at a memory care unit. She was brought in by EMS for reported weakness. Family reported she was more lethargic. NH staff reported UE were weaker. Interval history / Subjective:     Lethargic with out sign of distress. Pt does not speak not following command  Not wating cant swallow well SLP - probably aspirating        Assessment & Plan: Altered mental status,possibly acute metabolic encephalopathy due to dehydration,hypernatremia vs seizure  with underlying advanced dementia  -There was concern for stroke given reported of extremity weakness  -MRI brain with prior left pterional craniotomy with no definite change in residual  intraosseous sphenoid wing meningioma, with associated left-sided proptosis. Left temporal lobe encephalomalacia without definite change. Chronic ischemic changes with no acute infarction. Diffuse atrophy. Several other meningiomas and diffuse pachymeningeal enhancement as above. - EEG,epileptoform discharge on the left temporal region match the left encephalomalacia.  -She is on Depakote not for seizure , stopped fo sedation Dr Sourav Lovell. Her son noted that she is becoming more lethargic and not able to eat and drink much lately and thinks if Depakote is causing this.  Will d/c Depakote 1/10  -Discussed with neurologist,recommended to keep the Depakote but can try without it     Fever probably with aspiration PNA  - started on zosyn  Repeat CXR - minimal basilar atelectasis 1/10   Lot of secretion - will use mycomyst and scopolamine patch    Hypernatremia due to ECF contraction:  -Na corrected with D5% na 140 Hypothyroidism,c/w synthroid. Mild hypokalemia: corrected. Pall care consult     Code status: DNR  DVT prophylaxis: lovenox    Care Plan discussed with: Nurse   Disposition: TBD Back to admission address . [facility] may need SNF care d/w Son 1/12     Hospital Problems  Date Reviewed: 6/13/2016          Codes Class Noted POA    * (Principal)Altered mental status ICD-10-CM: R41.82  ICD-9-CM: 780.97  1/3/2017 Yes        Dehydration ICD-10-CM: E86.0  ICD-9-CM: 276.51  1/3/2017 Yes        Hypernatremia ICD-10-CM: E87.0  ICD-9-CM: 276.0  1/3/2017 Yes                Review of Systems:   Review of systems not obtained due to patient factors. Vital Signs:    Last 24hrs VS reviewed since prior progress note. Most recent are:  Visit Vitals    /63 (BP 1 Location: Right leg, BP Patient Position: At rest)    Pulse 95    Temp (!) 100.5 °F (38.1 °C)    Resp 28    Ht 5' 7\" (1.702 m)    Wt 56.2 kg (123 lb 14 oz)    SpO2 98%    Breastfeeding No    BMI 19.4 kg/m2         Intake/Output Summary (Last 24 hours) at 01/12/17 1109  Last data filed at 01/12/17 2397   Gross per 24 hour   Intake           7478.5 ml   Output                0 ml   Net           7478.5 ml        Physical Examination:             Constitutional:  awake    ENT:  Left eye bulged out. Resp:  few wheezing coarse secretion +    CV:  Regular rhythm, normal rate rubs    GI:  Soft, non distended,  no hepatosplenomegaly     Musculoskeletal: Pedal edema, SCds on .     Neurologic:  AAO x 0 does not follow command , non verbal             Data Review:    Review and/or order of clinical lab test  Review and/or order of tests in the radiology section of CPT  Review and/or order of tests in the medicine section of CPT      Labs:     Recent Labs      01/12/17   0100  01/11/17   0247   WBC  9.1  6.4   HGB  11.2*  10.9*   HCT  34.6*  34.2*   PLT  397  296     Recent Labs      01/12/17   0100  01/11/17   2310  01/11/17   0247   NA  136 135*  138   K  3.9  4.0  3.8   CL  98  98  101   CO2  28  24  30   BUN  10  9  8   CREA  0.71  0.58  0.51*   GLU  166*  170*  116*   CA  7.7*  7.9*  7.8*   MG  1.7  1.8   --      No results for input(s): SGOT, GPT, ALT, AP, TBIL, TBILI, TP, ALB, GLOB, GGT, AML, LPSE in the last 72 hours. No lab exists for component: AMYP, HLPSE  No results for input(s): INR, PTP, APTT in the last 72 hours. No lab exists for component: INREXT, INREXT   No results for input(s): FE, TIBC, PSAT, FERR in the last 72 hours. No results found for: FOL, RBCF   No results for input(s): PH, PCO2, PO2 in the last 72 hours. No results for input(s): CPK, CKNDX, TROIQ in the last 72 hours.     No lab exists for component: CPKMB  Lab Results   Component Value Date/Time    Cholesterol, total 148 06/13/2016 04:06 PM    HDL Cholesterol 51 06/13/2016 04:06 PM    LDL, calculated 77.4 06/13/2016 04:06 PM    Triglyceride 98 06/13/2016 04:06 PM    CHOL/HDL Ratio 2.9 06/13/2016 04:06 PM     Lab Results   Component Value Date/Time    Glucose (POC) 123 01/12/2017 06:58 AM    Glucose (POC) 120 01/10/2017 04:57 PM    Glucose (POC) 140 01/10/2017 11:59 AM    Glucose (POC) 113 01/09/2017 07:02 PM    Glucose (POC) 121 01/09/2017 11:52 AM     Lab Results   Component Value Date/Time    Color YELLOW/STRAW 01/08/2017 06:07 PM    Appearance CLEAR 01/08/2017 06:07 PM    Specific gravity <1.005 01/08/2017 06:07 PM    pH (UA) 6.5 01/08/2017 06:07 PM    Protein NEGATIVE  01/08/2017 06:07 PM    Glucose NEGATIVE  01/08/2017 06:07 PM    Ketone NEGATIVE  01/08/2017 06:07 PM    Bilirubin NEGATIVE  01/08/2017 06:07 PM    Urobilinogen 0.2 01/08/2017 06:07 PM    Nitrites NEGATIVE  01/08/2017 06:07 PM    Leukocyte Esterase TRACE 01/08/2017 06:07 PM    Epithelial cells FEW 01/08/2017 06:07 PM    Bacteria 1+ 01/08/2017 06:07 PM    WBC 0-4 01/08/2017 06:07 PM    RBC 0-5 01/08/2017 06:07 PM         Medications Reviewed:     Current Facility-Administered Medications   Medication Dose Route Frequency    piperacillin-tazobactam (ZOSYN) 3.375 g in 0.9% sodium chloride (MBP/ADV) 100 mL  3.375 g IntraVENous Q8H    acetaminophen (TYLENOL) suppository 650 mg  650 mg Rectal Q4H PRN    albuterol-ipratropium (DUO-NEB) 2.5 MG-0.5 MG/3 ML  3 mL Nebulization Q6H RT    acetylcysteine (MUCOMYST) 200 mg/mL (20 %) solution 200 mg  200 mg Nebulization BID RT    furosemide (LASIX) injection 20 mg  20 mg IntraVENous ONCE    scopolamine (TRANSDERM-SCOP) 1.5 mg  1.5 mg TransDERmal Q72H    dextrose 5% infusion  75 mL/hr IntraVENous CONTINUOUS    magnesium oxide (MAG-OX) tablet 400 mg  400 mg Oral DAILY    potassium chloride (KAON 10%) 20 mEq/15 mL oral liquid 20 mEq  20 mEq Oral DAILY    acetaminophen (TYLENOL) tablet 500 mg  500 mg Oral Q6H PRN    aspirin delayed-release tablet 81 mg  81 mg Oral DAILY    atorvastatin (LIPITOR) tablet 10 mg  10 mg Oral DAILY    cholecalciferol (VITAMIN D3) tablet 1,000 Units  1,000 Units Oral DAILY    levothyroxine (SYNTHROID) tablet 125 mcg  125 mcg Oral ACB    sodium chloride (NS) flush 5-10 mL  5-10 mL IntraVENous Q8H    sodium chloride (NS) flush 5-10 mL  5-10 mL IntraVENous PRN    enoxaparin (LOVENOX) injection 40 mg  40 mg SubCUTAneous Q24H    hydrALAZINE (APRESOLINE) 20 mg/mL injection 10 mg  10 mg IntraVENous Q6H PRN     ______________________________________________________________________  EXPECTED LENGTH OF STAY: 3d 12h  ACTUAL LENGTH OF STAY:           9 Days               Cira Khan MD

## 2017-01-12 NOTE — PROGRESS NOTES
NUTRITION brief    Recommendations:   1. Please change diet order to NPO so pt does not received trays for now   - SLP recommending holding PO until goals of care established       Diet: Full liquids + supplements    Chart reviewed for PO check and plan of care. Pt remains lethargic with increase in oral secretions noted. Has been requiring deep suctioning, scopolamine added. Concerns for aspiration PNA noted with abx added. SLP recommending NPO status for now with Palliative Care consult noted. Agree with holding PO for now. With hx of dementia, pt's age, and likely aspiration with even just secretions do not think pt appropriate for aggressive nutrition intervention. Will follow for results of Palliative Care consult and pt care plan.      Lukas Norris RD

## 2017-01-12 NOTE — PROGRESS NOTES
Night Hospitalist :  Called for Elevated MEW Score , fever of 101.9 , Aspirating and sounding course , requiring deep suctioning . HR up to 170s and BP up to 954 s systolic , per nurse looked like SVT , responded to 5mg IV Cardizem . HR better   O/E : NON VERBAL            Heart S1S2 regular            Chest bibasal course rales            CXR yesterday showed Minimal bibasilar atelectasis  A/ fever , suspecting Aspiration Pneumonia       -Repeat CXR      -Check Lactic acid       -Blood cultures       -Start IV Zosyn     D/W the patient nurse .

## 2017-01-12 NOTE — PROGRESS NOTES
2240 - Pt HR in 170's. Temp taken rectally 101.9. MD paged. Pt turned and repositioned to keep her dry & comfortable. Oral suction done. Cough is very congested however cough is strong at times. 2242 - MD orders for CXR and lab work to included a lactic acid and blood cultures. 2325 - Pt HR now sustaining 170s. MD order for IVP Cardizem x 1.    2345 - HR 110s. MD on unit & assessing pt. New orders to come.

## 2017-01-12 NOTE — INTERDISCIPLINARY ROUNDS
IDR/SLIDR Summary          Patient: Wojciech Ji MRN: [id number]    Age: 80 y.o. YOB: 1929 Room/Bed: [facility]   Admit Diagnosis: Altered mental status  Principal Diagnosis: Altered mental status   Goals:   Readmission: NO  Quality Measure: PNA  VTE Prophylaxis: Mechanical and Chemical  Influenza Vaccine screening completed? YES  Pneumococcal Vaccine screening completed? NO  Mobility needs: Yes   Nutrition plan:Yes  Consults:P.T, O.T., Speech, Respiratory and Case Management    Financial concerns:No  Escalated to CM? YES  RRAT Score:  20  Interventions:H2H  Testing due for pt today?  NO  LOS: 9 days Expected length of stay  days  Discharge plan:    PCP: Radha Abbasi MD  Transportation needs: Yes    Days before discharge:one day until discharge   Discharge disposition: Nursing Home    Signed:     Luis Carrera RN  1/12/2017  0900

## 2017-01-12 NOTE — PROGRESS NOTES
Cm spoke with palliative care NP regarding patient and family is in agreement with hospice. Referral sent via [employer] [facility] to [facility]. Will continue to follow.   [employer] , [place]

## 2017-01-12 NOTE — CONSULTS
Palliative Medicine Consult  Tate: [phone] ([phone])    Patient Name: Erickson Ryan  YOB: 1929    Date of Initial Consult: January 12, 2017  Reason for Consult: Care Goals  Requesting Provider:  Dr. Kate Isaacs   Primary Care Physician: Sarabjit Cyr MD      SUMMARY:   Erickson Ryan is a 80y.o. year old wheelchair bound female with a past history of dementia, HTN, multiple meningiomas, left orbital meningioma s/p resection, SDH,  who was admitted on 1/3/2017 from [facility] at [facility]  with a diagnosis of AMS, Hypernatremia, dehydration. Admission complicated by presumed aspiration, fever, copious secretions, dyspnea, and rapid decline in status. Current medical issues leading to Palliative Medicine involvement include: support with end of life care. Psychosocial: former , , 2 kids, has lived at [facility] for about 1.5 years,    PALLIATIVE DIAGNOSES:   1. Shortness of breath  2. Altered Mental Status  3. Copious oropharyngeal Secretions  4. Dementia  5. Goals of care       PLAN:   1. Spoke with son  Hao De Jesus  2. Discussed the patient's condition and options for care moving forward  3. Recommended hospice given terminal condition. Explained that artificially prolonging therapies and agents including ivf and iv antibiotics will be discontinued. Focus will be on the patient's symptoms and ensuring comfort. 4. Son agrees with hospice care. Given the patient's symptoms she may qualify for inpatient hospice  5. Comfort care orders placed  6. Consult placed for hospice  7. Initial consult note routed to primary continuity provider  8.  Communicated plan of care with: Palliative IDT       GOALS OF CARE / TREATMENT PREFERENCES:   [====Goals of Care====]  GOALS OF CARE:  Patient / health care proxy stated goals: hospice      TREATMENT PREFERENCES:   Code Status: DNR    Advance Care Planning:  Advance Care Planning 1/12/2017   Patient's Healthcare Decision Maker is: Named in scanned ACP document   Primary Decision Maker Name doctor dara sanchez   Primary Decision Maker Phone Number [phone]   Primary Decision Maker Relationship to Patient Adult child   Confirm Advance Directive -   Does the patient have other document types -       Other:    The palliative care team has discussed with patient / health care proxy about goals of care / treatment preferences for patient.  [====Goals of Care====]         HISTORY:     History obtained from:  Chart and son    CHIEF COMPLAINT: pt nonverbal    HPI/SUBJECTIVE:    The patient is:   [] Verbal and participatory  [x] Non-participatory due to: dementia   No objective indications of physical pain appreciated    Clinical Pain Assessment (nonverbal scale for severity on nonverbal patients):    Adult Non-Verbal Pain Assessment    Face  [] 0   No particular expression or smile  [] 1   Occasional grimace, tearing, frowning, wrinkled forehead  [] 2   Frequent grimace, tearing, frowning, wrinkled forehead    Activity (movement)  [x] 0   Lying quietly, normal position  [] 1   Seeking attention through movement or slow, cautious movement  [] 2   Restless, excessive activity and/or withdrawal reflexes    Guarding  [x] 0   Lying quietly, no positioning of hands over areas of body  [] 1   Splinting areas of the body, tense  [] 2   Rigid, stiff    Physiology (vital signs)  [x] 0   Stable vital signs  [] 1   Change in any of the following: SBP > 20mm Hg; HR > 20/minute  [] 2   Change in any of the following: SBP > 30mm Hg; HR > 25/minute    Respiratory  [] 0   Baseline RR/SpO2, compliant with ventilator  [] 1   RR > 10 above baseline, or 5% drop SpO2, mild asynchrony with ventilator  [x] 2   RR > 20 above baseline, or 10% drop SpO2, asynchrony with ventilator    Total Non-Verbal Pain Score: 2     FUNCTIONAL ASSESSMENT:     Palliative Performance Scale (PPS): 20          PSYCHOSOCIAL/SPIRITUAL SCREENING:     Advance Care Planning:  Advance Care Planning 1/12/2017   Patient's Healthcare Decision Maker is: Named in scanned ACP document   Primary Decision Maker Name doctor dara sanchez   Primary Decision Maker Phone Number [phone]   Primary Decision Maker Relationship to Patient Adult child   Confirm Advance Directive -   Does the patient have other document types -        Any spiritual / Taoism concerns:  [] Yes /  [x] No    Caregiver Burnout:  [] Yes /  [x] No /  [] No Caregiver Present      Anticipatory grief assessment:   [x] Normal  / [] Maladaptive       ESAS Anxiety:  pt nonverbal    ESAS Depression:   pt nonverbal       REVIEW OF SYSTEMS:     Positive and pertinent negative findings in ROS are noted above in HPI. The following systems were [] reviewed / [x] unable to be reviewed as noted in HPI  Other findings are noted below. Systems: constitutional, ears/nose/mouth/throat, respiratory, gastrointestinal, genitourinary, musculoskeletal, integumentary, neurologic, psychiatric, endocrine. Positive findings noted below. Modified ESAS Completed by: provider                                            PHYSICAL EXAM:     Wt Readings from Last 3 Encounters:   01/12/17 123 lb 14 oz (56.2 kg)   06/13/16 129 lb 4.8 oz (58.7 kg)   12/30/15 153 lb 6.4 oz (69.6 kg)     Blood pressure 105/40, pulse 80, temperature 98.2 °F (36.8 °C), resp. rate 24, height 5' 7\" (1.702 m), weight 123 lb 14 oz (56.2 kg), SpO2 91 %, not currently breastfeeding.   Pain:  Pain Scale 1: Visual  Pain Intensity 1: 0     Pain Location 1: Generalized        Pain Intervention(s) 1: Medication (see MAR), Repositioned, Rest  Last bowel movement, if known:     Constitutional: ill appearing  Eyes: non icterus, exophthalmus os   ENMT: no nasal discharge, moist mucous membranes  Cardiovascular: distal pulses intact  Respiratory: breathing somewhat labored, symmetric, audible secretions  Gastrointestinal: soft non-tender,   Musculoskeletal: no deformity, no tenderness to palpation  Skin: warm, dry  Neurologic: following no commands, moving no extremities  Psychiatric: intermittent agitation   Other:       HISTORY:     Principal Problem:    Altered mental status (1/3/2017)    Active Problems:    Dehydration (1/3/2017)      Hypernatremia (1/3/2017)      Past Medical History   Diagnosis Date    Hyperlipemia     Hyperlipemia     Hypertension     Hypothyroid     Osteoporosis     Subdural hematoma, acute ([address] July 2015      Past Surgical History   Procedure Laterality Date    Hx other surgical       vocal cord polyp    Hx other surgical       mult meningioma resection      History reviewed. No pertinent family history. History reviewed, no pertinent family history.   Social History   Substance Use Topics    Smoking status: Former Smoker     Types: Cigarettes     Quit date: 1/1/1965    Smokeless tobacco: Never Used    Alcohol use 3.5 oz/week     7 Standard drinks or equivalent per week      Comment: 1 glass wine daily     No Known Allergies   Current Facility-Administered Medications   Medication Dose Route Frequency    acetaminophen (TYLENOL) suppository 650 mg  650 mg Rectal Q4H PRN    albuterol-ipratropium (DUO-NEB) 2.5 MG-0.5 MG/3 ML  3 mL Nebulization Q6H RT    acetylcysteine (MUCOMYST) 200 mg/mL (20 %) solution 200 mg  200 mg Nebulization BID RT    scopolamine (TRANSDERM-SCOP) 1.5 mg  1.5 mg TransDERmal Q72H    glycopyrrolate (ROBINUL) injection 0.2 mg  0.2 mg IntraVENous Q4H PRN    atropine 1 % ophthalmic solution 1 Drop  1 Drop SubLINGual TID    morphine injection 2 mg  2 mg IntraVENous Q15MIN PRN    LORazepam (ATIVAN) injection 1 mg  1 mg IntraVENous Q15MIN PRN    haloperidol lactate (HALDOL) injection 3 mg  3 mg IntraVENous Q4H PRN    acetaminophen (TYLENOL) tablet 500 mg  500 mg Oral Q6H PRN    aspirin delayed-release tablet 81 mg  81 mg Oral DAILY    sodium chloride (NS) flush 5-10 mL  5-10 mL IntraVENous PRN    hydrALAZINE (APRESOLINE) 20 mg/mL injection 10 mg  10 mg IntraVENous Q6H PRN          LAB AND IMAGING FINDINGS:     Lab Results   Component Value Date/Time    WBC 9.1 01/12/2017 01:00 AM    HGB 11.2 01/12/2017 01:00 AM    PLATELET 681 57/57/1921 01:00 AM     Lab Results   Component Value Date/Time    Sodium 136 01/12/2017 01:00 AM    Potassium 3.9 01/12/2017 01:00 AM    Chloride 98 01/12/2017 01:00 AM    CO2 28 01/12/2017 01:00 AM    BUN 10 01/12/2017 01:00 AM    Creatinine 0.71 01/12/2017 01:00 AM    Calcium 7.7 01/12/2017 01:00 AM    Magnesium 1.7 01/12/2017 01:00 AM    Phosphorus 2.2 01/04/2017 05:07 AM      Lab Results   Component Value Date/Time    AST 14 01/03/2017 02:55 PM    Alk. phosphatase 66 01/03/2017 02:55 PM    Protein, total 7.1 01/03/2017 02:55 PM    Albumin 3.0 01/03/2017 02:55 PM    Globulin 4.1 01/03/2017 02:55 PM     Lab Results   Component Value Date/Time    INR 1.0 07/13/2015 06:51 PM    INR (POC) 1.0 06/13/2016 03:11 PM    Prothrombin time 10.5 07/13/2015 06:51 PM    aPTT 34.9 07/13/2015 06:51 PM      No results found for: IRON, FE, TIBC, IBCT, PSAT, FERR   No results found for: PH, PCO2, PO2  No components found for: [place]   Lab Results   Component Value Date/Time    CK 73 07/13/2015 07:15 PM    CK - MB 0.9 07/13/2015 07:15 PM                Total time: 70 minutes  Counseling / coordination time: 45 minutes  > 50% counseling / coordination?: y    Prolonged service was provided for  []30 min   []75 min in face to face time in the presence of the patient. Time Start:   Time End:   Note: this can only be billed with 03128 (initial) or 27822 (follow up). If multiple start / stop times, list each separately.

## 2017-01-13 NOTE — PROGRESS NOTES
Bedside shift change report given to Kat Feldman (oncoming nurse) by Claus Chong RN (offgoing nurse). Report included the following information SBAR, Kardex, Procedure Summary, Intake/Output, MAR, Recent Results and Cardiac Rhythm SR/ST. Patient is now comfort care.

## 2017-01-13 NOTE — HOSPICE
visited patient (room ) as an initial visit in order to complete a hospice spiritual assessment. Her dtr-in-law, Roque Mendoza, was present at the bedside holding azevedo; [facility].  introduced self and role as a part of the hospice care team.  Roque Mendoza went on to talk about how it was difficult to see the patient in her current state because she was so full of life. She went on to share that this current situation reminded her very much of her own mother's passing.  inquired about the patient's wishes and family's thoughts about  and [facility] services, per Annita's MSW request.  Roque Mendoza and Joe Krause shared that the patient was not active in the [address] [place] [address] and informed the family that she would prefer to be cremated. In regards to a , family will hold a celebration of life at a family member's home. Patient did not appear anxious and/or agitated. Patient is actively dying. She appeared comfortable. Roque Mendoza was appropriately tearful. Family spoke with joyful admiration of the patient. No immediate needs and/or concerns were expressed during this encounter.  sat as a comforting presence actively listening to the family express their thoughts and sentiments.  assured the family of his continuous support, prayers, and availability.       Natalya Lucio., [place] [address] Must[address]ha [address], [facility]   W: [phone]  F: [phone]   [email or web address]   Good Help to Those in [facility]

## 2017-01-13 NOTE — PROGRESS NOTES
Speech Therapy    Chart reviewed and spoke with RN. Patient is familiar to this service. She has been on a full liquid diet since early this week with very minimal intake. Even with that, there were concerns for aspiration due to coughing; though coughing was both with and without PO, making assessment of swallow challenging. All PO was provided very carefully, in small amounts, only when patient was upright and actively accepting. PO discontinued with any s/s of aspiration. Yesterday, she began to show changes in her ability to manage even her secretions. She began to require suctioning and a scopolamine patch. Some changes in respirations. Family meeting with palliative and discussing Hospice. Per discussion with RN and chart review, it appears family has decided on comfort measures and transition to Hospice. Given her decline, little else for SLP to offer. Will defer any diet orders to MD. If family feels patient gains comfort from small amounts of PO, would allow small amounts of liquid or puree, acknowledging that she is likely aspirating. Would avoid solids due to risk for asphyxiation. Any medications needed would likely be best IV or sublingual if possible. Thank you. Marli Valero M.S., CCC-SLP

## 2017-01-13 NOTE — HOSPICE
Met with family who have elected comfort care with hospice support. Son and daughter expressed understanding of patient status. Patient admitted to [facility] [address] at [facility] level of care for symptom management. Discussed with Dr Deborah Nair earlier and received telephone orders from Dr aJiro Alejo. Reviewed care with unit nurse [facility].       Jack Ann RN  [facility] Liaison

## 2017-01-13 NOTE — HOSPICE
[facility]  [phone]    Inpatient Nursing Admission   Patient Name: Elyse Montiel  YOB: 1929  Age: 80 y.o. Date of Hospice Admission: 1/12/2017  Hospice Attending: Rachel Hoffman MD  Primary Care Physician: [address], [place]  Admitting RN: Satnam Guerrero   : Joan Iraheta     Level of Care (GIP/Routine/Respite): GIP  Facility of Care: [facility]  Patient Room: 200/26     HOSPICE SUMMARY   ER Visits/ Hospitalizations in past year:   Hospice Diagnosis: sepsis  Sepsis ([address]  Sepsis ([address]  Onset Date of Hospice Diagnosis: 1/12/17    Co-Morbidities:   Patient Active Problem List   Diagnosis Code    Acquired hypothyroidism E03.9    Hyperlipidemia E78.5    S/P resection of meningioma Z98.890, Z86.018    Ocular proptosis H05.20    Pubic ramus fracture ([address] S32.599A    Osteopenia, senile M85.80    Hypovitaminosis D E55.9    Age-related cognitive decline R41.81    Subdural hematoma ([address] I62.00    Subdural hematoma caused by concussion ([address] S06.5X9A    Ankle edema M25.473    Frequent falls R29.6    Lethargy R53.83    Altered mental status R41.82    Dehydration E86.0    Hypernatremia E87.0    Sepsis ([address] A41. 9        Principle Hospice Diagnosis: Sepsis  Diagnoses RELATED to the terminal prognosis: UTI, aspiration, metabolic encephalopathy, excess secretions  Other Diagnoses: Advanced dementia, meningioma, hypothyroidism     HOSPICE NARRATIVE COMPOSED BY PHYSICIAN Per Dr Traci Muller   Rationale for a prognosis of life expectancy of 6 months or less if the disease follows its normal course:     Elyse Montiel is a 80y.o. year old who was admitted to [facility]. The patient's principle diagnosis of sepsis has resulted in metabolic encephalopathy, shortness of breath, and increased secretions. Functionally, the patient's Palliative Performance Scale has declined over a period of a week and is estimated at 10.  Objective information that support this patients limited prognosis includes:CT head 1/8/17 with stable meningioma, RR >35, UCx + for enterococcus on 1/11/17, The patient/family chose comfort measures with the support of Hospice. Saw pt on 1/12/17 with Schuyler Ray, NP Palliative.      HOSPICE DIAGNOSES   Active Symptoms:  1. Shortness of breath  2. Incr oropharyngeal secretions   3. Debility   4. Advanced dementia      PLAN   1. Admit pt to [facility] services for aggressive sx management and end of life care. 2. Secretions: A lot of secretions yesterday- improved w/ scopolamine patch, Robinul and   Atropine scheduled. Also have prn.   3. SOB: Work to breathe improved since yest but RR still about 30. Changed Morphine 2mg every 4h to every 3h. Also have prn.   4. Hx of meningioma and sz: Off Depakote, no sz activity. Has prn Ativan. 5. Toradol for fevers. 6.  and SW to support family needs. I spoke w/ Son  [facility] this AM who is grateful for all care, aware pt actively dying, okay w/ comfort measures.    7. Disposition: Likely death in hospital.       Prognosis estimated based on 01/13/17 clinical assessment is:   [] Few to Many Hours  [] Hours to Days   [x] Few to Many Days   [] Days to Weeks   [] Few to Many Weeks   [] Weeks to Months   [] Few to Many Months    ADVANCE CARE PLANNING (Complete in ACP Flow Sheet)   Code Status: DNR  Durable DNR: _ Yes  _ No  Advance Care Planning 1/12/2017   Patient's Healthcare Decision Maker is: Named in scanned ACP document   Primary Decision Maker Name doctor dara sanchez   Primary Decision Maker Phone Number [phone]   Primary Decision Maker Relationship to Patient Adult child   Confirm Advance Directive Yes, on file   Does the patient have other document types -        Service: [] Yes  []  No      [] Unknown  Appropriate for Pinning Ceremony:  [] Yes     [] No  Mu-ism: Cheondoism    ASSESSMENT    Quality Measure: Patient self-reports:  []  Yes    [x] No    SYMPTOMS: Unable to obtain subjective due to patient factors  Non verbal indicators, wet respirations, moaning, body posturing    ESAS:   Time of Assessment: 1830  Pain (1-10): 6  Shortness of breath (1-10) 5  Nausea (1-10):   Constipation: []  Yes [] No    FALL RISK:  [] Low   [x] Moderate    [] High    [] Not able to assess    KARNOFSKY:  20    PRESSURE ULCERS   José Miguel Scale (pressure ulcer risk):  9    FAST for all dementia:     Progression to DEPENDENCE WITH ADLs (include time frame): patient was previously WC bound prior to this hospital stay  [x] Dependent for bathing: personal hygiene and grooming   [x] Dependent for dressing: dressing and undressing   [x] Dependent for transferring: movement and mobility   [x] Dependent for toileting: continence-related tasks including control and hygiene   [x] Dependent for eating: preparing food and feeding    CLINICAL INFORMATION   Mid-arm Circumference (MAC):        Wt Readings from Last 3 Encounters:   01/12/17 56.2 kg (123 lb 14 oz)   06/13/16 58.7 kg (129 lb 4.8 oz)   12/30/15 69.6 kg (153 lb 6.4 oz)     There is no height or weight on file to calculate BMI. Visit Vitals    /68 (BP 1 Location: Left arm, BP Patient Position: At rest)    Pulse (!) 112    Temp 100.2 °F (37.9 °C)    Resp 24    SpO2 96%       (!) 101 °F (38.3 °C) (!) 124 22 109/56 92 %     Currently this patient has:  [x] Supplemental O2 [x] Peripheral IV  [] PICC    [] PORT [x] Suction  [] Paiz Catheter [] NG Tube   [] PEG Tube [] Ostomy    [] AICD: Has ICD been deactivated? [] Yes [] No:______    SIGNS/PHYSICAL FINDINGS: Patient non verbal, pale, pasty, eyes closed, responsive to pain, audible wet breath sounds, chest rales, hypoactive bowel sounds, pedal weak, incontinent of urine, meplilex border to hip for protection intact and dry  LAB VALUES  No results found for this visit on 01/12/17 (from the past 12 hour(s)). No results found for this visit on 01/12/17 (from the past 6 hour(s)).   Lab Results   Component Value Date/Time    Protein, total 7.1 01/03/2017 02:55 PM    Albumin 3.0 01/03/2017 02:55 PM       ALLERGIES AND MEDICATIONS     Allergies: No Known Allergies        Current Facility-Administered Medications:       albuterol-ipratropium (DUO-NEB) 2.5 MG-0.5 MG/3 ML, 3 mL, Nebulization, Q6H RT, Yoko Rollins MD, 3 mL at 01/13/17 0840     scopolamine (TRANSDERM-SCOP) 1.5 mg, 1.5 mg, TransDERmal, Q72H,     morphine injection 2 mg, 2 mg, IntraVENous, A9D  Dr Jacyln Noble     LORazepam (ATIVAN) injection 1 mg, 1 mg, IntraVENous, [id number] PRN, Jaclyn Noble MD, 1 mg at 01/12/17 2045    glycopyrrolate (ROBINUL) injection 0.2 mg, 0.2 mg, IntraVENous, K1G PRN, Jaclyn Noble MD    bisacodyl (DULCOLAX) suppository 10 mg, 10 mg, Rectal, DAILY PRN, Jaclyn Noble MD    morphine injection 2 mg, 2 mg, IntraVENous, [id number] PRN, Jaclyn Noble MD    glycopyrrolate (ROBINUL) injection 0.2 mg, 0.2 mg, IntraVENous, M7B, Jaclyn Noble MD, 0.2 mg at 01/13/17 8887  ASSESSMENT & PLAN     Non verbal indicators:  Scheduled morphine as above with prn availability  SOB:  Morphine, oxygen  Altered airway clearance/increased secretion load requiring frequent suctioning:  Avoid sublingual route for medication adminstration, scopolamine patch, scheduled Robinul with prn availability        CAREGIVER SUPPORT:  [x] Provided information on End of Life Care   [] Material Provided: Gone From My Sight or Journey's End     1.  FALL INTERVENTIONS PROVIDED (if the fall risk is >10,  the intervention below was provided)     [x] Implement/recommend assistive devices and encouraged their use (use full bed rails, etc)  [x] Implemented/recommended resources for alar, system (personal alarm, bed alarm, call bell, etc)  [x] Implemented/recommended environmental changes (remove hazards, lower bed, improper lighting, etc.)  [] Implemented/recommended increased supervision/assistance  [] Scheduled PT and/or OT evaluation for mobility/transfer techniques and/or assistive device recommendations    2. Initial Bereavement POC  [] LOW RISK Indications: normal grief, good support, opens expression. [x] MODERATE RISK Indications: grief normal but severe, depression, somatic distress, low support  [] HIGH RISK indications: Hx of mental illness, suicidal ideation, depression, somatic distress, excessive guilt or anger, multiple losses, other life crisis. 3. Oxygen SAFETY and Anticoagulation SAFETY: Only if being transferred to home environment    4. Copies of Election of Benefit and Hospice Consent:  [x] Hospice Folder Provided  [] See Electronic Health Records for past medical records    5. Spiritual Issues Identified: See SW note,  and Rabbi prn    6. Psych/ Social/ Emotional Issues Identified: See SW note. Patient has 2 sons. Alex Alvarez and his wife present for admit. Other son is arriving from [address] [place] [address] tomorrow    7. Care Coordination:   Dr. Otf Lama contacted for medication reconciliation, hospice services and treatment. Also, spoke with Dr Paras Amezquita prior to admit  MEDS: See medication list above  DME: Per hospital/hospice house  Supplies: Per hospital/hospice house  [place] [facility]:     9. Hospice Team Orders  [x] Skilled Nurse - Daily and prn  [x] MSW  1 visit and then prn for assessment/evaluation for family support and need for volunteer services  [x]   1 visit and then prn for assessment/evaluation for spiritual support.   services will contact / Clinical Manager for further orders  [] Hospice Aide to be evaluated by  (if patient being transferred to home environment)

## 2017-01-13 NOTE — PROGRESS NOTES
TRANSFER - IN REPORT:    Verbal report received from JOSE ENRIQUE Cantrell(name) on Janet Blanchard  being received from 6S (unit) for routine progression of care      Report consisted of patients Situation, Background, Assessment and   Recommendations(SBAR). Information from the following report(s) SBAR and Kardex was reviewed with the receiving nurse. Opportunity for questions and clarification was provided. Assessment completed upon patients arrival to unit and care assumed.

## 2017-01-13 NOTE — PROGRESS NOTES
01/12/17 2300   Vitals   Temp (!) 101 °F (38.3 °C)   Temp Source Axillary   Pulse (Heart Rate) (!) 124   Heart Rate Source Monitor   Resp Rate 22   O2 Sat (%) 92 %   Level of Consciousness Responds to Voice   /56   MAP (Calculated) 74   BP 1 Location Left arm   BP 1 Method Automatic   BP Patient Position At rest   Cardiac Rhythm Sinus Tach   MEWS Score 5   Box Number 654   Electrodes Replaced No   Pain 1   Pain Scale 1 Adult Nonverbal Pain Scale   Pain Intensity 1 5   Patient Stated Pain Goal Unable to verbalize/indicatate pain   Pain Intervention(s) 1 Medication (see MAR)   Adult Nonverbal Pain Scale   Face 0   Activity (Movement) 0   Guarding 2   Physiology (Vital Signs) 1   Respiratory 2   Total Score 5   Oxygen Therapy   O2 Device Nasal cannula   O2 Flow Rate (L/min) 4 l/min   Patient Observation   Repositioned Head of bed elevated (degrees)   Patient Turned Turn on left side   Hourly Rounds Yes  (q1h rounds)   Activity In bed;Sleeping   Notified hospitalist, Dr. Maxim Archer, about MEWS score of 5, and patient being on comfort care. No new orders received.

## 2017-01-13 NOTE — PROGRESS NOTES
Transition Of Care Note    NNTOCIP     Date/Time:  1/13/2017 4:47 PM    Patient listed on discharge POOL [facility]) report on 1/12/17. [facility] for [facility]argy. RRAT score: 14    Medical History:     Past Medical History   Diagnosis Date    Hyperlipemia     Hyperlipemia     Hypertension     Hypothyroid     Osteoporosis     Subdural hematoma, acute [facility]) July 2015     Hospital course:  80y.o. year old wheelchair bound female with a past history of dementia, HTN, multiple meningiomas, left orbital meningioma s/p resection, SDH,  who was admitted on 1/3/2017 from [facility], [facility]  with a diagnosis of AMS, Hypernatremia, dehydration. Admission complicated by presumed aspiration, fever, copious secretions, dyspnea, and rapid decline in status. Consults: Neurology, Palliative and Hospice. Patient has now transitioned to in patient Hospice. Nurse Navigator(NN) contacted the patient's MPOA, Dr. Vikki Ko by telephone . Provided introduction to self, and explanation of the Nurses Navigator role. Diet:   Patient reports: comfort measures; non verbal    Activity:    Patient reports: bedridden    Medication:  Comfort measures. Support system:  Hospice    Labs Reviewed:  Recent Labs      01/12/17   0100  01/11/17   0247   WBC  9.1  6.4   HGB  11.2*  10.9*   HCT  34.6*  34.2*   PLT  397  296     Recent Labs      01/12/17   0100  01/11/17   2310  01/11/17   0247   NA  136  135*  138   K  3.9  4.0  3.8   CL  98  98  101   CO2  28  24  30   GLU  166*  170*  116*   BUN  10  9  8   CREA  0.71  0.58  0.51*   CA  7.7*  7.9*  7.8*   MG  1.7  1.8   --      No components found for: GLPOC  No results for input(s): PH, PCO2, PO2, HCO3, FIO2 in the last 72 hours. No results for input(s): INR in the last 72 hours.     No lab exists for component: INREXT    Advance Care Planning:   Patient was offered the opportunity to discuss advance care planning:  no     Does patient have an Advance Directive:  yes   If no, did you provide information on Caring Connections?  no     Plan:  Spoke with patient's son, Dr. Micki Pichardo, [facility], who advised the transition to hospice has been seamless and they are very grateful for the care provided by the RN's on the floor as well as hospice. No other needs identified at this time.

## 2017-01-13 NOTE — PROGRESS NOTES
TRANSFER - OUT REPORT:    Verbal report given to [address] (name) on Lanette Cue  being transferred to 6 E (unit) for routine progression of care       Report consisted of patients Situation, Background, Assessment and   Recommendations(SBAR). Information from the following report(s) SBAR, Kardex, MAR, Accordion and Cardiac Rhythm Sinus Tach was reviewed with the receiving nurse. Lines:   Peripheral IV 01/10/17 Left Arm (Active)   Site Assessment Clean, dry, & intact 1/12/2017  8:00 PM   Phlebitis Assessment 0 1/12/2017  8:00 PM   Infiltration Assessment 0 1/12/2017  8:00 PM   Dressing Status Clean, dry, & intact 1/12/2017  8:00 PM   Dressing Type Transparent;Tape 1/12/2017  8:00 PM   Hub Color/Line Status Capped;Blue;End cap changed; Flushed 1/12/2017  8:00 PM   Action Taken Open ports on tubing capped 1/12/2017  8:00 PM   Alcohol Cap Used Yes 1/12/2017  8:00 PM        Opportunity for questions and clarification was provided.       Patient transported with:   O2 @ 4 liters  Registered Nurse

## 2017-01-13 NOTE — PROGRESS NOTES
Primary Nurse Krystle Liao RN and Zheng Rodriguez RN performed a dual skin assessment on this patient Impairment noted- see wound doc flow sheet  José Miguel score is 7. Patient has redness to Left hip, covered with a Mepelex border prior to transfer to [address] [place] [address]. Otherwise, skin is intact.

## 2017-01-13 NOTE — H&P
[facility]  [phone]    Patient Name: Rommel Costello  YOB: 1929    Date of Provider Hospice Visit: 01/13/17    Level of Care:   [x] General Inpatient (GIP)    [] Routine   [] Respite    Location of Care:  [x] [facility] [place] [] [facility] [] [address] [] [facility] [] Hospice [facility]  [] Home [] Other:      Date of Original Hospice Admission: 1/12/16  Hospice Attending: Kellie eVlasquez Hospice Diagnosis: Sepsis  Diagnoses RELATED to the terminal prognosis: UTI, aspiration, metabolic encephalopathy, excess secretions  Other Diagnoses: Advanced dementia, meningioma, hypothyroidism     HOSPICE NARRATIVE COMPOSED BY PHYSICIAN   Rationale for a prognosis of life expectancy of 6 months or less if the disease follows its normal course:    Rommel Costello is a 80y.o. year old who was admitted to [facility]. The patient's principle diagnosis of sepsis has resulted in metabolic encephalopathy, shortness of breath, and increased secretions. Functionally, the patient's Palliative Performance Scale has declined over a period of a week and is estimated at 10. Objective information that support this patients limited prognosis includes:CT head 1/8/17 with stable meningioma, RR >35, UCx + for enterococcus on 1/11/17, The patient/family chose comfort measures with the support of Hospice. Saw pt on 1/12/17 with Aleshia Dawson, NP Palliative. HOSPICE DIAGNOSES   Active Symptoms:  1. Shortness of breath  2. Incr oropharyngeal secretions   3. Debility   4. Advanced dementia      PLAN   1. Admit pt to [facility] services for aggressive sx management and end of life care. 2. Secretions: A lot of secretions yesterday- improved w/ scopolamine patch, Robinul and   Atropine scheduled. Also have prn.   3. SOB: Work to breathe improved since yest but RR still about 30. Changed Morphine 2mg every 4h to every 3h. Also have prn.   4. Hx of meningioma and sz: Off Depakote, no sz activity. Has prn Ativan. 5. Toradol for fevers. 6.  and SW to support family needs. I spoke w/ Son  Scott Ferris this AM who is grateful for all care, aware pt actively dying, okay w/ comfort measures. 7. Disposition: Likely death in hospital.     Prognosis estimated based on 01/13/17 clinical assessment is:   [] Few to Many Hours  [x] Few to Many Days    [] Few to Many Weeks    [] Few to Many Months    Communicated plan of care with: Hospice Case Manager; Hospice IDT; Care Team; Aron Sykes bedside RN     GOALS OF CARE     Resuscitation Status: DNR  Durable DNR: [x] Yes [] No    Advance Care Planning 1/12/2017   Patient's Healthcare Decision Maker is: Named in scanned ACP document   Primary Decision Maker Name doctor lgclair daniel   Primary Decision Maker Phone Number [phone]   Primary Decision Maker Relationship to Patient Adult child   Confirm Advance Directive Yes, on file   Does the patient have other document types -        HISTORY     History obtained from: chart, family staff    CHIEF COMPLAINT:  The patient is:   [] Verbal  [] Nonverbal  [x] Unresponsive    HPI/SUBJECTIVE:  Pt more comfortable today; RR 30, secretions are improved.           REVIEW OF SYSTEMS     The following systems were: [] reviewed  [x] unable to be reviewed    Positive ROS include:  Constitutional:  Ears/nose/mouth/throat:  Respiratory:  Gastrointestinal:  Musculoskeletal:  Neurologic:  Psychiatric:  Endocrine:     Adult Non-Verbal Pain Assessment Score: 1    Face  [x] 0   No particular expression or smile  [] 1   Occasional grimace, tearing, frowning, wrinkled forehead  [] 2   Frequent grimace, tearing, frowning, wrinkled forehead    Activity (movement)  [x] 0   Lying quietly, normal position  [] 1   Seeking attention through movement or slow, cautious movement  [] 2   Restless, excessive activity and/or withdrawal reflexes    Guarding  [x] 0   Lying quietly, no positioning of hands over areas of body  [] 1   Splinting areas of the body, tense  [] 2 Rigid, stiff    Physiology (vital signs)  [x] 0   Stable vital signs  [] 1   Change in any of the following: SBP > 20mm Hg; HR > 20/minute  [] 2   Change in any of the following: SBP > 30mm Hg; HR > 25/minute    Respiratory  [] 0   Baseline RR/SpO2, compliant with ventilator  [x] 1   RR > 10 above baseline, or 5% drop SpO2, mild asynchrony with ventilator  [] 2   RR > 20 above baseline, or 10% drop SpO2, asynchrony with ventilator     FUNCTIONAL ASSESSMENT     Palliative Performance Scale (PPS): 10      PSYCHOSOCIAL/SPIRITUAL ASSESSMENT     Active Problems:    S[address]is [address] (1/12/2017)      Past Medical History   Diagnosis Date    Hyperlipemia     Hyperlipemia     Hypertension     Hypothyroid     Osteoporosis     Subdural hematoma, acute ([facility] [address] July 2015      Past Surgical History   Procedure Laterality Date    Hx other surgical       vocal cord polyp    Hx other surgical       mult meningioma resection      Social History   Substance Use Topics    Smoking status: Former Smoker     Types: Cigarettes     Quit date: 1/1/1965    Smokeless tobacco: Never Used    Alcohol use 3.5 oz/week     7 Standard drinks or equivalent per week      Comment: 1 glass wine daily     No family history on file.    No Known Allergies   Current Facility-Administered Medications   Medication Dose Route Frequency    glycopyrrolate (ROBINUL) injection 0.2 mg  0.2 mg IntraVENous Q4H PRN    LORazepam (ATIVAN) injection 1 mg  1 mg IntraVENous Q15MIN PRN    morphine injection 2 mg  2 mg IntraVENous Q15MIN PRN    sodium chloride (NS) flush 5-10 mL  5-10 mL IntraVENous PRN    acetaminophen (TYLENOL) suppository 650 mg  650 mg Rectal Q4H PRN    acetaminophen (TYLENOL) tablet 500 mg  500 mg Oral Q6H PRN    acetylcysteine (MUCOMYST) 200 mg/mL (20 %) solution 200 mg  200 mg Nebulization BID RT    albuterol-ipratropium (DUO-NEB) 2.5 MG-0.5 MG/3 ML  3 mL Nebulization Q6H RT    aspirin delayed-release tablet 81 mg  81 mg Oral DAILY    atropine 1 % ophthalmic solution 1 Drop  1 Drop SubLINGual TID    haloperidol lactate (HALDOL) injection 3 mg  3 mg IntraVENous Q4H PRN    hydrALAZINE (APRESOLINE) 20 mg/mL injection 10 mg  10 mg IntraVENous Q6H PRN    [START ON 1/15/2017] scopolamine (TRANSDERM-SCOP) 1.5 mg  1.5 mg TransDERmal Q72H    ketorolac (TORADOL) injection 15 mg  15 mg IntraVENous Q6H PRN    LORazepam (ATIVAN) injection 1 mg  1 mg IntraVENous Q15MIN PRN    glycopyrrolate (ROBINUL) injection 0.2 mg  0.2 mg IntraVENous Q4H PRN    scopolamine (TRANSDERM-SCOP) 1.5 mg  1.5 mg TransDERmal Q72H PRN    bisacodyl (DULCOLAX) suppository 10 mg  10 mg Rectal DAILY PRN    ketorolac (TORADOL) injection 15 mg  15 mg IntraVENous Q6H PRN    morphine injection 2 mg  2 mg IntraVENous Q15MIN PRN    glycopyrrolate (ROBINUL) injection 0.2 mg  0.2 mg IntraVENous Q4H    morphine injection 2 mg  2 mg IntraVENous Q4H        PHYSICAL EXAM     Wt Readings from Last 3 Encounters:   01/12/17 123 lb 14 oz (56.2 kg)   06/13/16 129 lb 4.8 oz (58.7 kg)   12/30/15 153 lb 6.4 oz (69.6 kg)       Visit Vitals    /68 (BP 1 Location: Left arm, BP Patient Position: At rest)    Pulse (!) 112    Temp 100.2 °F (37.9 °C)    Resp 24    SpO2 96%       Supplemental O2  [x] Yes  [] NO  Last bowel movement: unknown    Currently this patient has:  [x] Peripheral IV [] PICC  [] PORT [] ICD    [x] Paiz Catheter [] NG Tube   [] PEG Tube    [] Rectal Tube [] Drain  [] Other:     Constitutional: unresponsive, NAD  Eyes: Erythema of L eye  ENMT: Dry mucous membranes  Cardiovascular: Regular, tachycardic   Respiratory: Mild labored breathing, incr secretions  Gastrointestinal: Hypoactive bowel sounds   Musculoskeletal:No abnormalities   Skin: warm, dry  Neurologic: Unresponsive  Psychiatric:   Other:    Pertinent Lab and or Imaging Tests:  Lab Results   Component Value Date/Time    Sodium 136 01/12/2017 01:00 AM    Potassium 3.9 01/12/2017 01:00 AM    Chloride 98 01/12/2017 01:00 AM    CO2 28 01/12/2017 01:00 AM    Anion gap 10 01/12/2017 01:00 AM    Glucose 166 01/12/2017 01:00 AM    BUN 10 01/12/2017 01:00 AM    Creatinine 0.71 01/12/2017 01:00 AM    BUN/Creatinine ratio 14 01/12/2017 01:00 AM    GFR est AA >60 01/12/2017 01:00 AM    GFR est non-AA >60 01/12/2017 01:00 AM    Calcium 7.7 01/12/2017 01:00 AM     Lab Results   Component Value Date/Time    Protein, total 7.1 01/03/2017 02:55 PM    Albumin 3.0 01/03/2017 02:55 PM           Total time:   Counseling / coordination time:   > 50% counseling / coordination?:

## 2017-01-14 NOTE — PROGRESS NOTES
Physical Therapy Screening:  Services are not indicated at this time. An InBasket screening referral was triggered for physical therapy based on results obtained during the nursing admission assessment. The patients chart was reviewed and the patient is not appropriate for a skilled therapy evaluation at this time. Please consult physical therapy if any therapy needs arise. Thank you.     Tashia Elliott, PT

## 2017-01-14 NOTE — HOSPICE
[facility]  [phone]    [facility] Daily Nursing Note   Patient Name: Jordan Worley  YOB: 1929  Age: 80 y.o. Date of Visit: 01/14/17  Facility of Care: [facility]  Patient Room: [facility]/     Hospice Attending: Albina Sung MD  Hospice Diagnosis: sepsis  Sepsis ([facility] [address]  Sepsis ([facility] [address]    Level of Care: [facility]  GIP Symptoms:     Current GIP Symptoms     Active Symptoms:  1. Shortness of breath  2. Incr oropharyngeal secretions   3. Debility   4. Advanced dementia     ASSESSMENT & PLAN   Must update Plan of Care including visit frequencies for IDT members  1. Shortness of breath - ocassional agonal pattern. Dyspnea increases with distress. Pt received two doses of prn Ativan with improvement in symptoms  2. Opharyngeal secretions - stable on schedule Robinul and topical scopolomine  3. Debility   4. Advanced dementia       Spiritual Interventions: Pastoral care visited patient today    Psych/ Social/ Emotional Interventions: Support offered to daughter in law at patients bedside    Care Coordination Needs: Reviewed plan of care with primary RN, Dr. Wilbert Magana and IDT    Care plan and New Orders discussed / approved with Dr. Wilbert Magana MD.    Description History and Chart Review   If this is initial GIP note must document RN assessment/MD communication in previous setting. Specifically document nursing/medication needs in last 24 hours to support GIP care    Jordan Worley is a 80y.o. year old wheelchair bound female with a past history of dementia, HTN, multiple meningiomas, left orbital meningioma s/p resection, SDH, who was admitted on 1/3/2017 from [facility] with a diagnosis of AMS, Hypernatremia, dehydration. Admission complicated by presumed aspiration, fever, copious secretions, dyspnea, and rapid decline in status. Patient was seen by Palliative Medicine and referred to Hospice. Patient was admitted inpatient, [facility] level of care on 1/12/2016. Patient has been well palliated to scheduled Morphine and prn Ativan. Her secretions are well managed on current regimen of Robinul and Scopolomine. What has been done to control the patient's symptoms in the last 24 hours? 1. Shortness of breath: morphine is scheduled every 3 hours with improvement in pain symptoms. RR around 20 this morning. Continue close monitoring for need in adjustments and use of prn medication  2. Secretions: improved overall and less audible but still needing aggressive management with scheduled robinul, atropine and scopolamine patch. Will monitor closely and considering adding other agents  3. Hx of meningioma and seizures. Off Depakote, and has not exhibited activity. Has taken 3 doses of ativan in last 24 hours for agitation but no seizures noted. 5. Fever last night and is receiving toradol as needed    Does the patient currently require IV medications? yes  Does the patient currently require scheduled medications? yes  Does the patient currently require a PCA?  No    List number of doses of PRN medications in last 24 hours:  Medication 1:  Number of doses:    Medication 2:   Number of doses:    Medication 3:   Number of doses:    Supporting documentation for GIP need for pain control:  [x] Frequent evaluation by a doctor, nurse practitioner, nurse   [x] Frequent medication adjustment    [x] IVs that cannot be administered at home   [x] Aggressive pain management   [] Complicated technical delivery of medications              Supporting documentation for GIP need for symptom control:  [x]  Sudden decline necessitating intensive nursing intervention  []  Uncontrolled / intractable nausea or vomiting   []  Pathological fractures  []  Advanced open wounds requiring frequent skilled care  [] Unmanageable respiratory distress  [] New or worsening delirium   [] Delirium with behavior issues: Is 24 hour caregiver present due to safety concerns with agitation? (yes/no)  [] Imminent death  with skilled nursing needs documented above    DISCHARGE PLANNING   Daily discharge planning required for GIP  1. Discharge Plan: Nursing home with hospice  2. Patient/Family teaching: Reviewed levels of care under hospice. Both sons were present when Dr. Annalisa Bernal rounded and he responded to their questions  3. Response to patient/family teaching: receptive    ASSESSMENT    KARNOFSKY: 10-20  Prognosis estimated based on 01/14/17 clinical assessment is:   [x] Few to Many Hours  [] Hours to Days   [] Few to Many Days   [] Days to Weeks   [] Few to Many Weeks   [] Weeks to Months   [] Few to Many Months    Quality Measure: Patient self-reports:  [] Yes    [x] No    ESAS:   Time of Assessment:   Pain (1-10): 5  Fatigue (1-10): 10  Shortness of breath (1-10):  Nausea (1-10): Appetite (1-10):    Anxiety: (1-10):   Depression: (1-10):   Well-being: (1-10):   Constipation: _ Yes  _ No  LAST BM: discussed with primary RN    CLINICAL INFORMATION   Patient Vitals for the past 12 hrs:   Temp Pulse Resp BP SpO2   01/14/17 0837 97.7 °F (36.5 °C) 90 15 112/71 100 %       Currently this patient has:  [] Supplemental O2   [x] IV    [] PICC      [] PORT   [] NG Tube    [] PEG Tube   [] Ostomy     [] Paiz draining _______ urine  [] Other:     SIGNS/PHYSICAL FINDINGS     Skin (including wound):  [x] Warm, dry, supple, intact and color normal for race  [] Warm   [] Dry   [] Cool     [] Clammy       [] Diaphoretic    Turgor   [] Normal   [x] Decreased  Color:   [] Pink   [] Pale   [] Cyanotic   [] Erythema   [] Jaundice   [x] Normal for Race  []  Wounds:    Neuro:  [x] Lethargy  [x] Restlessness / agitation  [] Confusion / delirium  [] Hallucinations  [] Responds to maximal stimulation  [] Unresponsive  [] Seizures     Cardiac:  [] Dyspnea on Exertion  [] JVD  [] Murmur  [] Palpitations  [] Hypotension  [] Hypertension  [] Tachycardia  [] Bradycardia  [] Irregular HR  [x] Pulses Decreased  [] Pulses Absent  [] Edema: (Location, Grade and Pitting)  [] Mottling:      (Location)    Respiratory:  Breath sounds:    [x] Diminished occasional agonal pattern   [] Wheeze   [x] Rhonchi   [] Rales   [] Even and unlabored  [] Labored:            [] Cough   [] Non Productive   [] Productive    [] Description:           [] Deep suctioned   [] O2 at ___ LPM  [] High flow oxygen greater than 10 LPM  [] Bi-Pap    GI  [] Abdomen (describe)   [] Ascites  [] Nausea  [] Vomiting  [] Incontinent of bowels  [x] Bowel sounds (yes/no) hypoactive[] Diarrhea  [] Constipation (see above including last bowel movement)  [] Checked for impaction  [] Last BM     Nutrition  Diet: not accepting oral fluids or food  Appetite:   [] Good   [] Fair   [] Poor   [] Tube Feeding       [] Voiding  [x] Incontinent   [] Paiz    Musculoskeletal  [] Balance/[place] Unsteady   [] Weak   Strength:    [] Normal    [] Limited    [x] Decreasing   Activities:    [] Up as tolerated   [x] Bedridden    [] Specify:    SAFETY  [] 24 hr. Caregiver   [x] Side rails ?     [x] Hospital bed   [] Reviewed Falls & Safety       ALLERGIES AND MEDICATIONS     Allergies: No Known Allergies    Current Facility-Administered Medications   Medication Dose Route Frequency    albuterol-ipratropium (DUO-NEB) 2.5 MG-0.5 MG/3 ML  3 mL Nebulization Q6H PRN    ketorolac (TORADOL) injection 15 mg  15 mg IntraVENous Q6H PRN    acetaminophen (TYLENOL) suppository 650 mg  650 mg Rectal Q4H PRN    atropine 1 % ophthalmic solution 1 Drop  1 Drop SubLINGual TID    hydrALAZINE (APRESOLINE) 20 mg/mL injection 10 mg  10 mg IntraVENous Q6H PRN    [START ON 1/15/2017] scopolamine (TRANSDERM-SCOP) 1.5 mg  1.5 mg TransDERmal Q72H    morphine injection 2 mg  2 mg IntraVENous Q3H    haloperidol lactate (HALDOL) injection 2 mg  2 mg IntraVENous Q4H PRN    acetylcysteine (MUCOMYST) 200 mg/mL (20 %) solution 200 mg  200 mg Nebulization BID PRN    LORazepam (ATIVAN) injection 1 mg  1 mg IntraVENous Q15MIN PRN    glycopyrrolate (ROBINUL) injection 0.2 mg  0.2 mg IntraVENous Q4H PRN    bisacodyl (DULCOLAX) suppository 10 mg  10 mg Rectal DAILY PRN    morphine injection 2 mg  2 mg IntraVENous Q15MIN PRN    glycopyrrolate (ROBINUL) injection 0.2 mg  0.2 mg IntraVENous Q4H          Visit Time In: 6173  Visit Time Out: 4299

## 2017-01-14 NOTE — HOSPICE
[facility]  [phone]    [facility] Daily Nursing Note   Patient Name: Renae Foley  YOB: 1929  Age: 80 y.o. Date of Visit: 01/13/17  Facility of Care: [facility]  Patient Room: [facility]     Hospice Attending: Kay Bhatti MD  Hospice Diagnosis: sepsis  Sepsis ([facility] [address]  Sepsis ([facility] [address]    Level of Care: [facility]  GIP Symptoms:     Current GIP Symptoms    1. Shortness of breath  2. Oropharyngeal secretions   3. Debility       ASSESSMENT & PLAN   Must update Plan of Care including visit frequencies for IDT members  1. Shortness of breath  2. Oropharyngeal secretions   3. Debility    Psych/ Social/ Emotional Interventions: Emotional support provided to patient son. Care Coordination Needs: Discussed with primary RN, CM, Dr. Ricky Sierra  and IDT    Care plan and New Orders discussed / approved with Ricky Sierra MD.    Description History and Chart Review   If this is initial GIP note must document RN assessment/MD communication in previous setting. Specifically document nursing/medication needs in last 24 hours to support GIP care  Narrative History of last 24 hours that demonstrates care cannot be provided in another setting:  Renae Foley is a 80y.o. year old wheelchair bound female with a past history of dementia, HTN, multiple meningiomas, left orbital meningioma s/p resection, SDH,  who was admitted on 1/3/2017 from [facility], [facility]  with a diagnosis of AMS, Hypernatremia, dehydration. Admission complicated by presumed aspiration, fever, copious secretions, dyspnea, and rapid decline in status. Patient was seen by Palliative Medicine and referred to Hospice. Patient was admitted inpatient, [facility] level of care on 1/12/2016. Patient has been well palliated to scheduled Morphine and Robinul. Her secretions are well managed on current regimen of Robinul and Scopolomine. What has been done to control the patient's symptoms in the last 24 hours?   Dr. Ricky Sierra changed Morphine 2mg every 4h to every 3h. Continued as needed dose of Morphine. Does the patient currently require IV medications? yes  Does the patient currently require scheduled medications? yes  Does the patient currently require a PCA? no    List number of doses of PRN medications in last 24 hours:  Medication 1: Ativan  Number of doses: 1    Medication 2:   Number of doses:    Medication 3:   Number of doses:    Supporting documentation for GIP need for pain control:  [x] Frequent evaluation by a doctor, nurse practitioner, nurse   [x] Frequent medication adjustment    [x] IVs that cannot be administered at home   [] Aggressive pain management   [] Complicated technical delivery of medications              Supporting documentation for GIP need for symptom control:  [x]  Sudden decline necessitating intensive nursing intervention  []  Uncontrolled / intractable nausea or vomiting   []  Pathological fractures  []  Advanced open wounds requiring frequent skilled care  [] Unmanageable respiratory distress  [] New or worsening delirium   [] Delirium with behavior issues: Is 24 hour caregiver present due to safety concerns with agitation? (yes/no)  [] Imminent death  with skilled nursing needs documented above    DISCHARGE PLANNING   Daily discharge planning required for GIP  1. Discharge Plan: To Nursing Home with Hospice  2. Patient/Family teaching:  Son verbalized understanding of the plan of care. Hospice Chaplain visited patient today  3.  Response to patient/family teaching: verbalized understanding     ASSESSMENT    KARNOFSKY:  10-20  Prognosis estimated based on 01/13/17 clinical assessment is:   [] Few to Many Hours  [x] Hours to Days   [] Few to Many Days   [] Days to Weeks   [] Few to Many Weeks   [] Weeks to Months   [] Few to Many Months    Quality Measure: Patient self-reports:  [] Yes    [x] No    ESAS:   Time of Assessment: 12:45  Pain (1-10): 3  Fatigue (1-10): 10  Shortness of breath (1-10): 5- was receiving nebulizer treatement  Nausea (1-10): 0  Appetite (1-10):    Anxiety: (1-10):   Depression: (1-10):   Well-being: (1-10):   Constipation: _ Yes  _ No  LAST BM: none documented since admission 1/3/2017    CLINICAL INFORMATION   Patient Vitals for the past 12 hrs:   SpO2   01/13/17 1324 92 %   01/13/17 0841 96 %       Currently this patient has:  [x] Supplemental O2   [x] IV    [] PICC      [] PORT   [] NG Tube    [] PEG Tube   [] Ostomy     [] Paiz draining _______ urine  [] Other:     SIGNS/PHYSICAL FINDINGS     Skin (including wound):  [x] Warm, dry, supple, intact and color normal for race  [] Warm   [] Dry   [] Cool     [] Clammy       [] Diaphoretic    Turgor   [] Normal   [x] Decreased  Color:   [] Pink   [] Pale   [] Cyanotic   [] Erythema   [] Jaundice   [x] Normal for Race  []  Wounds:    Neuro:  [x] Lethargy  [] Restlessness / agitation  [] Confusion / delirium  [] Hallucinations  [] Responds to maximal stimulation  [] Unresponsive  [] Seizures     Cardiac:  [] Dyspnea on Exertion  [] JVD  [] Murmur  [] Palpitations  [] Hypotension  [] Hypertension  [] Tachycardia  [] Bradycardia  [] Irregular HR  [x] Pulses Decreased  [] Pulses Absent  [] Edema:       (Location, Grade and Pitting)  [] Mottling:      (Location)    Respiratory:  Breath sounds:    [x] Diminished   [] Wheeze   [x] Rhonchi   [] Rales   [] Even and unlabored  [] Labored:            [x] Cough   [x] Non Productive   [] Productive    [] Description:           [] Deep suctioned   [x] O2 at ___ LPM  [] High flow oxygen greater than 10 LPM  [] Bi-Pap    GI  [] Abdomen (describe)   [] Ascites  [] Nausea  [] Vomiting  [] Incontinent of bowels  [] Bowel sounds (yes/no)  [] Diarrhea  [] Constipation (see above including last bowel movement)  [] Checked for impaction  [x] Last BM  Not document since admission on 1/3    Nutrition  Diet: NPO  Appetite:   [] Good   [] Fair   [] Poor   [] Tube Feeding       [] Voiding  [x] Incontinent   [] Izabel    Musculoskeletal  [] Balance/[place] Unsteady   [] Weak   Strength:    [] Normal    [] Limited    [x] Decreasing   Activities:    [] Up as tolerated   [] Bedridden    [] Specify:    SAFETY  [] 24 hr. Caregiver   [x] Side rails ?     [x] Hospital bed   [] Reviewed Falls & Safety       ALLERGIES AND MEDICATIONS     Allergies: No Known Allergies    Current Facility-Administered Medications   Medication Dose Route Frequency    acetaminophen (TYLENOL) suppository 650 mg  650 mg Rectal Q4H PRN    albuterol-ipratropium (DUO-NEB) 2.5 MG-0.5 MG/3 ML  3 mL Nebulization Q6H RT    atropine 1 % ophthalmic solution 1 Drop  1 Drop SubLINGual TID    hydrALAZINE (APRESOLINE) 20 mg/mL injection 10 mg  10 mg IntraVENous Q6H PRN    [START ON 1/15/2017] scopolamine (TRANSDERM-SCOP) 1.5 mg  1.5 mg TransDERmal Q72H    morphine injection 2 mg  2 mg IntraVENous Q3H    haloperidol lactate (HALDOL) injection 2 mg  2 mg IntraVENous Q4H PRN    acetylcysteine (MUCOMYST) 200 mg/mL (20 %) solution 200 mg  200 mg Nebulization BID PRN    LORazepam (ATIVAN) injection 1 mg  1 mg IntraVENous Q15MIN PRN    glycopyrrolate (ROBINUL) injection 0.2 mg  0.2 mg IntraVENous Q4H PRN    bisacodyl (DULCOLAX) suppository 10 mg  10 mg Rectal DAILY PRN    morphine injection 2 mg  2 mg IntraVENous Q15MIN PRN    glycopyrrolate (ROBINUL) injection 0.2 mg  0.2 mg IntraVENous Q4H          Visit Time In: 12:45  Visit Time Out: 1:15

## 2017-01-14 NOTE — PROGRESS NOTES
01/13/17 2036   Vital Signs   Temp (!) 102.7 °F (39.3 °C)  (notified RN)   Temp Source Axillary   Pulse (Heart Rate) (!) 123   Heart Rate Source Monitor   Resp Rate 26   O2 Sat (%) 93 %   Level of Consciousness (!) Unresponsive   /84   MAP (Calculated) 104   BP 1 Method Automatic   BP 1 Location Right arm   BP Patient Position At rest   MEWS Score 9     Patient is on comfort measures. Gave tylenol suppository and ice therapy to help bring down the temperature.

## 2017-01-14 NOTE — PROGRESS NOTES
Bedside shift change report given to Harinder Davey (oncoming nurse) by Calista Lucero RN (offgoing nurse). Report included the following information SBAR.

## 2017-01-14 NOTE — PROGRESS NOTES
[address] [place] [address] [facility]  [phone]    Patient Name: Jeremy Durham  YOB: 1929    Date of Provider Hospice Visit: 01/14/17    Level of Care:   [x] General Inpatient (GIP)    [] Routine   [] Respite    Location of Care:  [x] [facility] [] [facility] [] [facility] [] [facility] [] [facility]  [] Home [] Other:      Date of Original Hospice Admission: 1/12/16  Hospice Attending: Kellie Velasquez Hospice Diagnosis: Sepsis  Diagnoses RELATED to the terminal prognosis: UTI, aspiration, metabolic encephalopathy, excess secretions  Other Diagnoses: Advanced dementia, meningioma, hypothyroidism     HOSPICE NARRATIVE COMPOSED BY PHYSICIAN   Rationale for a prognosis of life expectancy of 6 months or less if the disease follows its normal course:    Jeremy Durham is a 80y.o. year old who was admitted to [facility]. The patient's principle diagnosis of sepsis has resulted in metabolic encephalopathy, shortness of breath, and increased secretions. Functionally, the patient's Palliative Performance Scale has declined over a period of a week and is estimated at 10. Objective information that support this patients limited prognosis includes:CT head 1/8/17 with stable meningioma, RR >35, UCx + for enterococcus on 1/11/17, The patient/family chose comfort measures with the support of Hospice. Saw pt on 1/12/17 with Natan Pascual NP Palliative. HOSPICE DIAGNOSES   Active Symptoms:  1. Shortness of breath  2. Incr oropharyngeal secretions   3. Debility   4. Advanced dementia      PLAN   1. Admit pt to [facility] services for aggressive sx management and end of life care. Secretions: improved overall and less audible but still needing aggressive management with scheduled robinul, atropine and scopolamine patch. Will monitor closely and considering adding other agents  3. SOB: morphine now scheduled every 3 hours and this change has seemed to help. RR around 20 this morning.  Continue close monitoring for need in adjustments. NO additional prn doses needed  4. Hx of meningioma and sz: Off Depakote, no sz activity. Has taken 1 dose of ativan in last 24 hours for agitation but no seizures noted  5. Fever last night-toradol for fever since IV access  6.  and SW to support family needs. Dr. Josefina Benton) and Malick([facility] at bedside. Reviewed current situation and all questions answered. 7. Disposition: Likely death in hospital. Continue GIP for today but if no need for changes in next 24 hours, may need to consider change to routine care    Prognosis estimated based on 01/14/17 clinical assessment is:   [] Few to Many Hours  [x] Few to Many Days    [] Few to Many Weeks    [] Few to Many Months    Communicated plan of care with: Hospice Case Manager; Hospice IDT; Care Team; Aron Sykes bedside RN     GOALS OF CARE     Resuscitation Status: DNR  Durable DNR: [x] Yes [] No    Advance Care Planning 1/12/2017   Patient's Healthcare Decision Maker is: Named in scanned ACP document   Primary Decision Maker Name doctor dara sanchez   Primary Decision Maker Phone Number [phone]   Primary Decision Maker Relationship to Patient Adult child   Confirm Advance Directive Yes, on file   Does the patient have other document types -        HISTORY     History obtained from: chart, family staff    CHIEF COMPLAINT:  The patient is:   [] Verbal  [] Nonverbal  [x] Unresponsive    HPI/SUBJECTIVE:  Pt appears comfortable. RR around 20.  No movements noted         REVIEW OF SYSTEMS     The following systems were: [] reviewed  [x] unable to be reviewed    Positive ROS include:  Constitutional:  Ears/nose/mouth/throat:  Respiratory:  Gastrointestinal:  Musculoskeletal:  Neurologic:  Psychiatric:  Endocrine:     Adult Non-Verbal Pain Assessment Score: 0    Face  [x] 0   No particular expression or smile  [] 1   Occasional grimace, tearing, frowning, wrinkled forehead  [] 2   Frequent grimace, tearing, frowning, wrinkled forehead    Activity (movement)  [x] 0   Lying quietly, normal position  [] 1   Seeking attention through movement or slow, cautious movement  [] 2   Restless, excessive activity and/or withdrawal reflexes    Guarding  [x] 0   Lying quietly, no positioning of hands over areas of body  [] 1   Splinting areas of the body, tense  [] 2   Rigid, stiff    Physiology (vital signs)  [x] 0   Stable vital signs  [] 1   Change in any of the following: SBP > 20mm Hg; HR > 20/minute  [] 2   Change in any of the following: SBP > 30mm Hg; HR > 25/minute    Respiratory  [x] 0   Baseline RR/SpO2, compliant with ventilator  [] 1   RR > 10 above baseline, or 5% drop SpO2, mild asynchrony with ventilator  [] 2   RR > 20 above baseline, or 10% drop SpO2, asynchrony with ventilator     FUNCTIONAL ASSESSMENT     Palliative Performance Scale (PPS): 10      PSYCHOSOCIAL/SPIRITUAL ASSESSMENT     Active Problems:    Sepsis ([address] (1/12/2017)      Past Medical History   Diagnosis Date    Hyperlipemia     Hyperlipemia     Hypertension     Hypothyroid     Osteoporosis     Subdural hematoma, acute ([address] July 2015      Past Surgical History   Procedure Laterality Date    Hx other surgical       vocal cord polyp    Hx other surgical       mult meningioma resection      Social History   Substance Use Topics    Smoking status: Former Smoker     Types: Cigarettes     Quit date: 1/1/1965    Smokeless tobacco: Never Used    Alcohol use 3.5 oz/week     7 Standard drinks or equivalent per week      Comment: 1 glass wine daily     No family history on file.    No Known Allergies   Current Facility-Administered Medications   Medication Dose Route Frequency    albuterol-ipratropium (DUO-NEB) 2.5 MG-0.5 MG/3 ML  3 mL Nebulization Q6H PRN    acetaminophen (TYLENOL) suppository 650 mg  650 mg Rectal Q4H PRN    atropine 1 % ophthalmic solution 1 Drop  1 Drop SubLINGual TID    hydrALAZINE (APRESOLINE) 20 mg/mL injection 10 mg  10 mg IntraVENous Q6H PRN    [START ON 1/15/2017] scopolamine (TRANSDERM-SCOP) 1.5 mg  1.5 mg TransDERmal Q72H    morphine injection 2 mg  2 mg IntraVENous Q3H    haloperidol lactate (HALDOL) injection 2 mg  2 mg IntraVENous Q4H PRN    acetylcysteine (MUCOMYST) 200 mg/mL (20 %) solution 200 mg  200 mg Nebulization BID PRN    LORazepam (ATIVAN) injection 1 mg  1 mg IntraVENous Q15MIN PRN    glycopyrrolate (ROBINUL) injection 0.2 mg  0.2 mg IntraVENous Q4H PRN    bisacodyl (DULCOLAX) suppository 10 mg  10 mg Rectal DAILY PRN    morphine injection 2 mg  2 mg IntraVENous Q15MIN PRN    glycopyrrolate (ROBINUL) injection 0.2 mg  0.2 mg IntraVENous Q4H        PHYSICAL EXAM     Wt Readings from Last 3 Encounters:   01/12/17 123 lb 14 oz (56.2 kg)   06/13/16 129 lb 4.8 oz (58.7 kg)   12/30/15 153 lb 6.4 oz (69.6 kg)       Visit Vitals    /84 (BP 1 Location: Right arm, BP Patient Position: At rest)    Pulse (!) 123    Temp 98.4 °F (36.9 °C)    Resp 26    SpO2 93%       Supplemental O2  [x] Yes  [] NO  Last bowel movement: unknown    Currently this patient has:  [x] Peripheral IV [] PICC  [] PORT [] ICD    [x] Paiz Catheter [] NG Tube   [] PEG Tube    [] Rectal Tube [] Drain  [] Other:     Constitutional: unresponsive, NAD  Eyes: Erythema of L eye  ENMT: Dry mucous membranes  Cardiovascular: Regular, tachycardic   Respiratory: no labored breathing this morning, diffuse rhonchi b/l but secretions much improved  Gastrointestinal: Hypoactive bowel sounds   Musculoskeletal:No abnormalities   Skin: warm, dry  Neurologic: Unresponsive  Psychiatric:   Other:    Pertinent Lab and or Imaging Tests:  Lab Results   Component Value Date/Time    Sodium 136 01/12/2017 01:00 AM    Potassium 3.9 01/12/2017 01:00 AM    Chloride 98 01/12/2017 01:00 AM    CO2 28 01/12/2017 01:00 AM    Anion gap 10 01/12/2017 01:00 AM    Glucose 166 01/12/2017 01:00 AM    BUN 10 01/12/2017 01:00 AM    Creatinine 0.71 01/12/2017 01:00 AM BUN/Creatinine ratio 14 01/12/2017 01:00 AM    GFR est AA >60 01/12/2017 01:00 AM    GFR est non-AA >60 01/12/2017 01:00 AM    Calcium 7.7 01/12/2017 01:00 AM     Lab Results   Component Value Date/Time    Protein, total 7.1 01/03/2017 02:55 PM    Albumin 3.0 01/03/2017 02:55 PM           Total time: 30  Counseling / coordination time:   > 50% counseling / coordination?:     This patient meets [facility] General Inpatient (GIP) Level of Care.     The precipitating event that resulted in the need for GIP was: sepsis, respiratory failure    The interventions tried that have been unsuccessful at controlling symptoms include: IV abx, sl medications    Supporting documentation for GIP need for pain control:  [x] Frequent evaluation by a doctor, nurse practitioner, nurse   [x] Frequent medication adjustment    [] IVs that cannot be administered at home   [] Aggressive pain management   [] Complicated technical delivery of medications              Supporting documentation for GIP need for symptom control:  []  Sudden decline necessitating intensive nursing intervention  []  Uncontrolled / intractable nausea or vomiting   []  Pathological fractures  []  Advanced open wounds requiring frequent skilled care  [x] Unmanageable respiratory distress  [] New or worsening delirium   [] Delirium with behavior issues  [] Imminent death  with skilled nursing needs documented above

## 2017-01-14 NOTE — PROGRESS NOTES
ADULT PROTOCOL: JET AEROSOL  REASSESSMENT    Patient  Janay Levy     80 y.o.   female     1/14/2017  12:42 AM    Breath Sounds Pre Procedure: Right Breath Sounds: Coarse, Diminished                               Left Breath Sounds: Coarse, Diminished    Breath Sounds Post Procedure: Right Breath Sounds: Coarse, Diminished                                 Left Breath Sounds: Coarse, Diminished    Breathing pattern: Pre procedure Breathing Pattern: Regular          Post procedure Breathing Pattern: Regular    Heart Rate: Pre procedure Pulse: 102           Post procedure Pulse: 122    Resp Rate: Pre procedure Respirations: 26           Post procedure Respirations: 24        Cough: Pre procedure Cough: Congested               Post procedure Cough: Congested    Suctioned: NO      Oxygen: O2 Device: Nasal cannula   Flow rate (L/min) 4     Changed: NO    SpO2: Pre procedure SpO2: 95 %   with oxygen              Post procedure SpO2: 96 %  with oxygen    Nebulizer Therapy: Current medications Aerosolized Medications: DuoNeb      Changed: YES    Smoking History: FORMER SMOKER PER H&P.     Problem List:   Patient Active Problem List   Diagnosis Code    Acquired hypothyroidism E03.9    Hyperlipidemia E78.5    S/P resection of meningioma Z98.890, Z86.018    Ocular proptosis H05.20    Pubic ramus fracture (HCC) S32.599A    Osteopenia, senile M85.80    Hypovitaminosis D E55.9    Age-related cognitive decline R41.81    Subdural hematoma ([address] I62.00    Subdural hematoma caused by concussion ([address] S06.5X9A    Ankle edema M25.473    Frequent falls R29.6    Lethargy R53.83    Altered mental status R41.82    Dehydration E86.0    Hypernatremia E87.0    Sepsis ([address] A41.9       Respiratory Therapist: Mitchell Chan

## 2017-01-14 NOTE — PROGRESS NOTES
Bedside shift change report given to Zahra Whiting RN (oncoming nurse) by Horace Dumont RN (offgoing nurse). Report included the following information SBAR.

## 2017-01-15 NOTE — PROGRESS NOTES
Bedside shift change report given to Shyam Sanchez RN (oncoming nurse) by Theo Pineda RN (offgoing nurse). Report included the following information SBAR.

## 2017-01-15 NOTE — DISCHARGE SUMMARY
Discharge Summary       PATIENT ID: Pernell Shaw  MRN: [id number]   YOB: 1929    DATE OF ADMISSION: 1/3/2017  2:19 PM    DATE OF DISCHARGE: 1/12/17   PRIMARY CARE PROVIDER: Marco Sanchez MD     ATTENDING PHYSICIAN: Bernabe Marino  DISCHARGING PROVIDER: Christine Price MD    To contact this individual call [phone] and ask the  to page. If unavailable ask to be transferred the Adult Hospitalist Department. CONSULTATIONS: IP CONSULT TO NEUROLOGY    PROCEDURES/SURGERIES: * No surgery found *    ADMITTING DIAGNOSES & HOSPITAL COURSE:     This is an 80 y.o lady with a history of HTN, subdural hematoma, left orbital meningioma s/p resection, dementia who resides at a memory care unit. She was brought in by EMS for reported weakness. Family reported she was more lethargic. NH staff reported UE were weaker.         Pt was d/c to hospice service       Assessment & Plan:      Altered mental status,possibly acute metabolic encephalopathy due to dehydration,hypernatremia vs seizure with underlying advanced dementia  -There was concern for stroke given reported of extremity weakness  -MRI brain with prior left pterional craniotomy with no definite change in residual  intraosseous sphenoid wing meningioma, with associated left-sided proptosis. Left temporal lobe encephalomalacia without definite change. Chronic ischemic changes with no acute infarction. Diffuse atrophy. Several other meningiomas and diffuse pachymeningeal enhancement as above. - EEG,epileptoform discharge on the left temporal region match the left encephalomalacia.  -She is on Depakote not for seizure , stopped fo sedation Dr Romi Rock. Her son noted that she is becoming more lethargic and not able to eat and drink much lately and thinks if Depakote is causing this.  Will d/c Depakote 1/10  -Discussed with neurologist,recommended to keep the Depakote but can try without it      Fever probably with aspiration PNA 1/12 - started on zosyn  Repeat CXR - minimal basilar atelectasis 1/10   Lot of secretion - will use mycomyst and scopolamine patch     Hypernatremia due to ECF contraction:  -Na corrected with D5% na 140      Hypothyroidism,c/w synthroid.     Mild hypokalemia: corrected.     Pall care consult      Code status: DNR           PENDING TEST RESULTS:   At the time of discharge the following test results are still pending:     FOLLOW UP APPOINTMENTS:    Follow-up Information     Follow up With Details Comments 550 Rogelio Case MD   [address] [place] [address] [facility]  [address] [place] [address] Road  [phone]             ADDITIONAL CARE RECOMMENDATIONS:     DIET: Comfort feeding  ACTIVITY: Activity as tolerated    WOUND CARE:     EQUIPMENT needed:       DISCHARGE MEDICATIONS:  Discharge Medication List as of 1/3/2017  4:45 PM      CONTINUE these medications which have NOT CHANGED    Details   aspirin delayed-release 81 mg tablet Take 81 mg by mouth daily. , Historical Med      Menthol-Zinc Oxide (CALMOSEPTINE) 0.44-20.6 % oint Apply  to affected area three (3) times daily. To Buttocks, Historical Med      guaiFENesin (ORGANIDIN) 400 mg tablet Take 400 mg by mouth daily as needed for Congestion. , Historical Med      levothyroxine (SYNTHROID) 125 mcg tablet Take 125 mcg by mouth Daily (before breakfast). , Historical Med      white petrolatum-mineral oil (REFRESH LACRI-LUBE) 56.8-42.5 % ointment Administer  to both eyes four (4) times daily as needed., Historical Med      multivitamins-ca-iron-minerals (TAB A ADAM) 18-0.4 mg tab Take 1 Tab by mouth daily. , Historical Med      acetaminophen (TYLENOL EXTRA STRENGTH) 500 mg tablet Take 500 mg by mouth every six (6) hours as needed for Pain., Historical Med      valproic acid, as sodium salt, 250 mg/5 mL syrg Take 5 mL by mouth three (3) times daily. , Historical Med      cholecalciferol (VITAMIN D3) 1,000 unit tablet Take 1,000 Units by mouth daily. , Historical Med atorvastatin (LIPITOR) 10 mg tablet Take 1 Tab by mouth daily. , Print, Disp-90 Tab, R-3      alendronate (FOSAMAX) 70 mg tablet Take 1 Tab by mouth every Sunday. , Print, Disp-12 Tab, R-3      fluticasone (FLONASE) 50 mcg/actuation nasal spray 2 Sprays by Both Nostrils route daily as needed for Rhinitis., Historical Med               NOTIFY YOUR PHYSICIAN FOR ANY OF THE FOLLOWING:   Fever over 101 degrees for 24 hours. Chest pain, shortness of breath, fever, chills, nausea, vomiting, diarrhea, change in mentation, falling, weakness, bleeding. Severe pain or pain not relieved by medications. Or, any other signs or symptoms that you may have questions about.     DISPOSITION:    Home With:   OT  PT  HH  RN       Long term SNF/Inpatient Rehab    Independent/assisted living   x Hospice    Other:       PATIENT CONDITION AT DISCHARGE:     Functional status    Poor     Deconditioned     Independent      Cognition     Lucid     Forgetful     Dementia      Catheters/lines (plus indication)    Paiz     PICC     PEG     None      Code status     Full code    x DNR      PHYSICAL EXAMINATION AT DISCHARGE:   Refer to Progress Note      CHRONIC MEDICAL DIAGNOSES:  Problem List as of 1/12/2017  Date Reviewed: 1/12/2017          Codes Class Noted - Resolved    Sepsis ([facility] [address] ICD-10-CM: A41.9  ICD-9-CM: 038.9, 995.91  1/12/2017 - Present        * (Principal)Altered mental status ICD-10-CM: R41.82  ICD-9-CM: 780.97  1/3/2017 - Present        Dehydration ICD-10-CM: E86.0  ICD-9-CM: 276.51  1/3/2017 - Present        Hypernatremia ICD-10-CM: E87.0  ICD-9-CM: 276.0  1/3/2017 - Present        Lethargy ICD-10-CM: R53.83  ICD-9-CM: 780.79  6/13/2016 - Present        Frequent falls ICD-10-CM: R29.6  ICD-9-CM: V15.88  5/18/2016 - Present        Ankle edema ICD-10-CM: M25.473  ICD-9-CM: 782.3  11/2/2015 - Present        Subdural hematoma ([facility] ICD-10-CM: I62.00  ICD-9-CM: 432.1  7/13/2015 - Present        Subdural hematoma caused by concussion [facility] ICD-10-CM: J45.0H7Y  ICD-9-CM: 852.29  7/13/2015 - Present        Age-related cognitive decline ICD-10-CM: R41.81  ICD-9-CM: 294.9  7/1/2015 - Present        Osteopenia, senile ICD-10-CM: M85.80  ICD-9-CM: 733.90  3/29/2015 - Present    Overview Addendum 11/2/2015  9:54 PM by Valeriano Stevens MD     [facility] 3/26/15  H/o pubic ramus fx  Fosamax started 7/2015                Hypovitaminosis D ICD-10-CM: E55.9  ICD-9-CM: 268.9  3/29/2015 - Present        Pubic ramus fracture ([address] ICD-10-CM: L41.601Q  ICD-9-CM: 808.2  3/26/2015 - Present    Overview Signed 3/26/2015 11:25 AM by Valeriano Stevens MD     Nov 2014 - evaluated by Dr. Alexandr Vasquez             S/P resection of meningioma ICD-10-CM: Z98.890, Z86.018  ICD-9-CM: V45.89  5/20/2014 - Present        Ocular proptosis ICD-10-CM: H05.20  ICD-9-CM: 376.30  5/20/2014 - Present        Acquired hypothyroidism ICD-10-CM: E03.9  ICD-9-CM: 244.9  5/14/2014 - Present        Hyperlipidemia ICD-10-CM: E78.5  ICD-9-CM: 272.4  5/14/2014 - Present        RESOLVED: Hormone replacement therapy ICD-10-CM: Z79.890  ICD-9-CM: V07.4  5/20/2014 - 3/26/2015        RESOLVED: Benign essential hypertension ICD-10-CM: I10  ICD-9-CM: 401.1  5/14/2014 - 11/2/2015              Greater than 20  minutes were spent with the patient on counseling and coordination of care    Signed:   Nora Ramos MD  1/15/2017  9:50 AM

## 2017-01-15 NOTE — HOSPICE
[facility]  [phone]    [facility] Daily Nursing Note   Patient Name: Elyse Montiel  YOB: 1929  Age: 80 y.o. Date of Visit: 01/15/17  Facility of Care: [facility]  Patient Room: [facility]/     Hospice Attending: Rachel Hoffman MD  Hospice Diagnosis: sepsis  Sepsis ([facility] [address]  Sepsis ([facility] [address]    Level of Care: [facility]  GIP Symptoms:     Current GIP Symptoms    1. Shortness of breath  2. Incr oropharyngeal secretions   3. Debility  4. Fever       ASSESSMENT & PLAN   Must update Plan of Care including visit frequencies for IDT members  1. Shortness of breath: Dr. Arianna Langley adjusted morphine scheduled with improvement in symptom  2. Oropharyngeal secretions:mproved overall and less audible but still needing aggressive management with scheduled robinul, atropine and scopolamine patch. 3. Debility   4. Debility:  5. Fever receiving prn toradol     Spiritual Interventions: Son and daughter in law at bedside    Psych/ Social/ Emotional Interventions: as above    Care Coordination Needs: Discussed with primary RN, Dr. Arianna Langley and IDT    Care plan and New Orders discussed / approved with Fartun Block. Arianna Langley MD.    Description History and Chart Review   If this is initial GIP note must document RN assessment/MD communication in previous setting. Specifically document nursing/medication needs in last 24 hours to support GIP care  Narrative History of last 24 hours that demonstrates care cannot be provided in another setting:  Elyse Montiel is a 80 y.o. year female with a past history of end stage dementia, HTN, multiple meningiomas, left orbital meningioma s/p resection, SDH, who was admitted on 1/3/2017 fr with a diagnosis of AMS, hypernatremia, dehydration. Admission complicated by presumed aspiration, fever, copious secretions, dyspnea, and rapid decline in status. Patient was admitted inpatient, [facility] level of care on 1/12/2016.  Patient has been well palliated to scheduled Morphine and prn Ativan. Her secretions are well managed on current regimen of Robinul and Scopolomine. What has been done to control the patient's symptoms in the last 24 hours? Morphine is scheduled every 3 hours with improvement in pain symptoms. Respirations agonal, shallow  8-12. Patient continues to require close monitoring for need in adjustments and use of prn medication Secretions are overall and less audible but still needing aggressive management with scheduled robinul, atropine and scopolamine patch. 3. Hx of meningioma and seizures. . Has taken 3 doses of ativan in last 24 hours for agitation but no seizures noted. Temperature fluctuating is receiving toradol as needed. Does the patient currently require IV medications? yes  Does the patient currently require scheduled medications? yes  Does the patient currently require a PCA? no    List number of doses of PRN medications in last 24 hours:  Medication 1:  Number of doses:    Medication 2:   Number of doses:    Medication 3:   Number of doses:    Supporting documentation for GIP need for pain control:  [x] Frequent evaluation by a doctor, nurse practitioner, nurse   [x] Frequent medication adjustment    [x] IVs that cannot be administered at home   [x] Aggressive pain management   [] Complicated technical delivery of medications              Supporting documentation for GIP need for symptom control:  [x]  Sudden decline necessitating intensive nursing intervention  []  Uncontrolled / intractable nausea or vomiting   []  Pathological fractures  []  Advanced open wounds requiring frequent skilled care  [] Unmanageable respiratory distress  [] New or worsening delirium   [] Delirium with behavior issues: Is 24 hour caregiver present due to safety concerns with agitation? (yes/no)  [x] Imminent death  with skilled nursing needs documented above    DISCHARGE PLANNING   Daily discharge planning required for GIP  1.  Discharge Plan: Patient appears terminal and anticipate death in hospital. Discussed with sons, and plan would be to return to [facility] with Hospice. 2. Patient/Family teaching: Signs and symptoms of end of life transitions  3. Response to patient/family teaching: Family receptive    ASSESSMENT    KARNOFSKY: 10     Prognosis estimated based on 01/15/17 clinical assessment is:   [x] Few to Many Hours  [] Hours to Days   [] Few to Many Days   [] Days to Weeks   [] Few to Many Weeks   [] Weeks to Months   [] Few to Many Months    Quality Measure: Patient self-reports:  [] Yes    [] No    ESAS:   Time of Assessment: 11:00  Pain (1-10): 3  Fatigue (1-10): 10  Shortness of breath (1-10): 3  Nausea (1-10): 0  Appetite (1-10):    Anxiety: (1-10):   Depression: (1-10):   Well-being: (1-10):   Constipation: _ Yes  _ No  LAST BM:     CLINICAL INFORMATION   Patient Vitals for the past 12 hrs:   Temp Pulse Resp BP SpO2   01/15/17 0802 99.8 °F (37.7 °C) (!) 126 30 107/70 (!) 78 %       Currently this patient has:  [] Supplemental O2   [x] IV    [] PICC      [] PORT   [] NG Tube    [] PEG Tube   [] Ostomy     [] Paiz draining _______ urine  [] Other:     SIGNS/PHYSICAL FINDINGS     Skin (including wound):  [] Warm, dry, supple, intact and color normal for race  [x] Warm   [] Dry   [] Cool     [] Clammy       [] Diaphoretic    Turgor   [] Normal   [x] Decreased  Color:   [] Pink   [x] Pale   [] Cyanotic   [] Erythema   [] Jaundice   [] Normal for Race  []  Wounds:    Neuro:  [x] Lethargy  [] Restlessness / agitation  [] Confusion / delirium  [] Hallucinations  [] Responds to maximal stimulation  [x] Unresponsive  [] Seizures     Cardiac:  [] Dyspnea on Exertion  [] JVD  [] Murmur  [] Palpitations  [] Hypotension  [] Hypertension  [] Tachycardia  [x] Bradycardia  [] Irregular HR  [] Pulses Decreased  [] Pulses Absent  [] Edema:       (Location, Grade and Pitting)  [] Mottling:      (Location)    Respiratory:  Breath sounds:    [x] Diminished   [] Wheeze   [x] Rhonchi   [] Rales   [] Even and unlabored  [x] Labored: agonal ,shallow  [] Cough   [] Non Productive   [] Productive    [] Description:           [] Deep suctioned   [] O2 at ___ LPM  [] High flow oxygen greater than 10 LPM  [] Bi-Pap    GI  [] Abdomen (describe)   [] Ascites  [] Nausea  [] Vomiting  [] Incontinent of bowels  [x] Bowel sounds (yes hypoactive  [] Diarrhea  [] Constipation (see above including last bowel movement)  [] Checked for impaction  [] Last BM     Nutrition  Diet: NPO  Appetite:   [] Good   [] Fair   [] Poor   [] Tube Feeding       [] Voiding  [x] Incontinent   [] Paiz    Musculoskeletal  [] Balance/Wallace Unsteady   [] Weak   Strength:    [] Normal    [] Limited    [x] Decreasing   Activities:    [] Up as tolerated   [x] Bedridden    [] Specify:    SAFETY  [] 24 hr. Caregiver   [x] Side rails ?     [x] Hospital bed   [x] Reviewed Falls & Safety       ALLERGIES AND MEDICATIONS     Allergies: No Known Allergies    Current Facility-Administered Medications   Medication Dose Route Frequency    morphine injection 2 mg  2 mg IntraVENous Q2H    LORazepam (ATIVAN) injection 1 mg  1 mg IntraVENous Q4H    albuterol-ipratropium (DUO-NEB) 2.5 MG-0.5 MG/3 ML  3 mL Nebulization Q6H PRN    ketorolac (TORADOL) injection 15 mg  15 mg IntraVENous Q6H PRN    bisacodyl (DULCOLAX) suppository 10 mg  10 mg Rectal DAILY PRN    acetaminophen (TYLENOL) suppository 650 mg  650 mg Rectal Q4H PRN    atropine 1 % ophthalmic solution 1 Drop  1 Drop SubLINGual TID    hydrALAZINE (APRESOLINE) 20 mg/mL injection 10 mg  10 mg IntraVENous Q6H PRN    scopolamine (TRANSDERM-SCOP) 1.5 mg  1.5 mg TransDERmal Q72H    haloperidol lactate (HALDOL) injection 2 mg  2 mg IntraVENous Q4H PRN    acetylcysteine (MUCOMYST) 200 mg/mL (20 %) solution 200 mg  200 mg Nebulization BID PRN    LORazepam (ATIVAN) injection 1 mg  1 mg IntraVENous Q15MIN PRN    glycopyrrolate (ROBINUL) injection 0.2 mg  0.2 mg IntraVENous Q4H PRN    bisacodyl (DULCOLAX) suppository 10 mg  10 mg Rectal DAILY PRN    morphine injection 2 mg  2 mg IntraVENous Q15MIN PRN    glycopyrrolate (ROBINUL) injection 0.2 mg  0.2 mg IntraVENous Q4H          Visit Time In: 11:00  Visit Time Out: 11:30

## 2017-01-15 NOTE — PROGRESS NOTES
Hospitalist on Call Death Note:     Called to examine patient who has . No response to verbal and tactile stimuli. No respiratory effort. Absent heart sounds and pulses. Pupils fixed and dilated. Patient pronounced dead at 16:00.     Anne Schwab M.D.

## 2017-01-15 NOTE — PROGRESS NOTES
Bedside shift change report given to   [address] (oncoming nurse) by Jay Jay Lee RN (offgoing nurse). Report included the following information SBAR.

## 2017-01-15 NOTE — PROGRESS NOTES
[facility] Those in Need  [phone]    Patient Name: Lizeth Beltran  YOB: 1929    Date of Provider Hospice Visit: 01/15/17    Level of Care:   [x] General Inpatient (GIP)    [] Routine   [] Respite    Location of Care:  [x] [facility] [] [facility] [] [facility] [] [facility] [] Hospice [facility]  [] Home [] Other:      Date of Original Hospice Admission: 1/12/16  Hospice Attending: Kellie Velasquez Hospice Diagnosis: Sepsis  Diagnoses RELATED to the terminal prognosis: UTI, aspiration, metabolic encephalopathy, excess secretions  Other Diagnoses: Advanced dementia, meningioma, hypothyroidism     HOSPICE NARRATIVE COMPOSED BY PHYSICIAN   Rationale for a prognosis of life expectancy of 6 months or less if the disease follows its normal course:    Lizeth Beltran is a 80y.o. year old who was admitted to [facility]. The patient's principle diagnosis of sepsis has resulted in metabolic encephalopathy, shortness of breath, and increased secretions. Functionally, the patient's Palliative Performance Scale has declined over a period of a week and is estimated at 10. Objective information that support this patients limited prognosis includes:CT head 1/8/17 with stable meningioma, RR >35, UCx + for enterococcus on 1/11/17, The patient/family chose comfort measures with the support of Hospice. Saw pt on 1/12/17 with Davi Pearson NP Palliative. HOSPICE DIAGNOSES   Active Symptoms:  1. Shortness of breath  2. Incr oropharyngeal secretions   3. Debility   4. Advanced dementia      PLAN   1. Admit pt to [facility] services for aggressive sx management and end of life care. 2.  Secretions: remains on atropine, scopolamine and robinul. Still secretions this morning and breathing more labored. See next note   3. SOB: concern about her labored breathing this morning and no prn morphine given. Will change morphine dosing to 2 mg IV every 2 hours and if no helping, consider continuous PCA  4.  Hx of meningioma but talked with her son and no seizures(was on valproic acid for anxiety): note some associated agitation this morning which may be related to her SOB but does have a history of seizures. Will add ativan 1 mg IV every 4 hours for restlessness. 5. Fever last night-toradol for fever since IV access  6.  and SW to support family needs. Dr. Osvaldo Au) and Malick(son from [facility]) at bedside. Reviewed current situation and all questions answered. 7. Disposition: Likely death in hospital. Continue GIP since many changes being made and frequent nurse evaluations. Discussed changes with Dr. Marlena Burks  Prognosis estimated based on 01/15/17 clinical assessment is:   [] Few to Many Hours  [x] Few to Many Days    [] Few to Many Weeks    [] Few to Many Months    Communicated plan of care with: Hospice Case Manager;  Hospice IDT; Care Team; Jaziel Wiseman bedside RN     GOALS OF CARE     Resuscitation Status: DNR  Durable DNR: [x] Yes [] No    Advance Care Planning 1/12/2017   Patient's Healthcare Decision Maker is: Named in scanned ACP document   Primary Decision Maker Name doctor dara sanchez   Primary Decision Maker Phone Number [phone]   Primary Decision Maker Relationship to Patient Adult child   Confirm Advance Directive Yes, on file   Does the patient have other document types -        HISTORY     History obtained from: chart, family staff    CHIEF COMPLAINT:  The patient is:   [] Verbal  [] Nonverbal  [x] Unresponsive    HPI/SUBJECTIVE:  Pt with eyes open, appear restless with increased SOB         REVIEW OF SYSTEMS     The following systems were: [] reviewed  [x] unable to be reviewed    Positive ROS include:  Constitutional:  Ears/nose/mouth/throat:  Respiratory:  Gastrointestinal:  Musculoskeletal:  Neurologic:  Psychiatric:  Endocrine:     Adult Non-Verbal Pain Assessment Score:3    Face  [] 0   No particular expression or smile  [x] 1   Occasional grimace, tearing, frowning, wrinkled forehead  [] 2 Frequent grimace, tearing, frowning, wrinkled forehead    Activity (movement)  [] 0   Lying quietly, normal position  [x] 1   Seeking attention through movement or slow, cautious movement  [] 2   Restless, excessive activity and/or withdrawal reflexes    Guarding  [] 0   Lying quietly, no positioning of hands over areas of body  [x] 1   Splinting areas of the body, tense  [] 2   Rigid, stiff    Physiology (vital signs)  [x] 0   Stable vital signs  [] 1   Change in any of the following: SBP > 20mm Hg; HR > 20/minute  [] 2   Change in any of the following: SBP > 30mm Hg; HR > 25/minute    Respiratory  [x] 0   Baseline RR/SpO2, compliant with ventilator  [] 1   RR > 10 above baseline, or 5% drop SpO2, mild asynchrony with ventilator  [] 2   RR > 20 above baseline, or 10% drop SpO2, asynchrony with ventilator     FUNCTIONAL ASSESSMENT     Palliative Performance Scale (PPS): 10      PSYCHOSOCIAL/SPIRITUAL ASSESSMENT     Active Problems:    Sepsis ([facility] [address] (1/12/2017)      Past Medical History   Diagnosis Date    Hyperlipemia     Hyperlipemia     Hypertension     Hypothyroid     Osteoporosis     Subdural hematoma, acute ([facility] [address] July 2015      Past Surgical History   Procedure Laterality Date    Hx other surgical       vocal cord polyp    Hx other surgical       mult meningioma resection      Social History   Substance Use Topics    Smoking status: Former Smoker     Types: Cigarettes     Quit date: 1/1/1965    Smokeless tobacco: Never Used    Alcohol use 3.5 oz/week     7 Standard drinks or equivalent per week      Comment: 1 glass wine daily     No family history on file.    No Known Allergies   Current Facility-Administered Medications   Medication Dose Route Frequency    morphine injection 2 mg  2 mg IntraVENous Q2H    LORazepam (ATIVAN) injection 1 mg  1 mg IntraVENous Q4H    albuterol-ipratropium (DUO-NEB) 2.5 MG-0.5 MG/3 ML  3 mL Nebulization Q6H PRN    ketorolac (TORADOL) injection 15 mg  15 mg IntraVENous Q6H PRN    bisacodyl (DULCOLAX) suppository 10 mg  10 mg Rectal DAILY PRN    acetaminophen (TYLENOL) suppository 650 mg  650 mg Rectal Q4H PRN    atropine 1 % ophthalmic solution 1 Drop  1 Drop SubLINGual TID    hydrALAZINE (APRESOLINE) 20 mg/mL injection 10 mg  10 mg IntraVENous Q6H PRN    scopolamine (TRANSDERM-SCOP) 1.5 mg  1.5 mg TransDERmal Q72H    haloperidol lactate (HALDOL) injection 2 mg  2 mg IntraVENous Q4H PRN    acetylcysteine (MUCOMYST) 200 mg/mL (20 %) solution 200 mg  200 mg Nebulization BID PRN    LORazepam (ATIVAN) injection 1 mg  1 mg IntraVENous Q15MIN PRN    glycopyrrolate (ROBINUL) injection 0.2 mg  0.2 mg IntraVENous Q4H PRN    bisacodyl (DULCOLAX) suppository 10 mg  10 mg Rectal DAILY PRN    morphine injection 2 mg  2 mg IntraVENous Q15MIN PRN    glycopyrrolate (ROBINUL) injection 0.2 mg  0.2 mg IntraVENous Q4H        PHYSICAL EXAM     Wt Readings from Last 3 Encounters:   01/12/17 123 lb 14 oz (56.2 kg)   06/13/16 129 lb 4.8 oz (58.7 kg)   12/30/15 153 lb 6.4 oz (69.6 kg)       Visit Vitals    /70 (BP 1 Location: Left arm, BP Patient Position: At rest)    Pulse (!) 126    Temp 99.8 °F (37.7 °C)    Resp 30    SpO2 (!) 78%       Supplemental O2  [x] Yes  [] NO  Last bowel movement: unknown    Currently this patient has:  [x] Peripheral IV [] PICC  [] PORT [] ICD    [x] Paiz Catheter [] NG Tube   [] PEG Tube    [] Rectal Tube [] Drain  [] Other:     Constitutional: unresponsive, appears in moderate respiratory distress with increased work of breathing  Eyes: Erythema of L eye  ENMT: Dry mucous membranes  Cardiovascular: Regular, tachycardic   Respiratory: moderate labored breathing this morning, diffuse rhonchi b/l with secretions  Gastrointestinal: Hypoactive bowel sounds   Musculoskeletal:No abnormalities   Skin: warm, dry  Neurologic: Unresponsive  Psychiatric:   Other:    Pertinent Lab and or Imaging Tests:  Lab Results   Component Value Date/Time    Sodium 136 01/12/2017 01:00 AM    Potassium 3.9 01/12/2017 01:00 AM    Chloride 98 01/12/2017 01:00 AM    CO2 28 01/12/2017 01:00 AM    Anion gap 10 01/12/2017 01:00 AM    Glucose 166 01/12/2017 01:00 AM    BUN 10 01/12/2017 01:00 AM    Creatinine 0.71 01/12/2017 01:00 AM    BUN/Creatinine ratio 14 01/12/2017 01:00 AM    GFR est AA >60 01/12/2017 01:00 AM    GFR est non-AA >60 01/12/2017 01:00 AM    Calcium 7.7 01/12/2017 01:00 AM     Lab Results   Component Value Date/Time    Protein, total 7.1 01/03/2017 02:55 PM    Albumin 3.0 01/03/2017 02:55 PM           Total time: 30  Counseling / coordination time:   > 50% counseling / coordination?:     This patient meets Hospice General Inpatient (GIP) Level of Care.     The precipitating event that resulted in the need for GIP was: sepsis, respiratory failure    The interventions tried that have been unsuccessful at controlling symptoms include: IV abx, sl medications    Supporting documentation for GIP need for pain control:  [x] Frequent evaluation by a doctor, nurse practitioner, nurse   [x] Frequent medication adjustment    [] IVs that cannot be administered at home   [] Aggressive pain management   [] Complicated technical delivery of medications              Supporting documentation for GIP need for symptom control:  []  Sudden decline necessitating intensive nursing intervention  []  Uncontrolled / intractable nausea or vomiting   []  Pathological fractures  []  Advanced open wounds requiring frequent skilled care  [x] Unmanageable respiratory distress  [] New or worsening delirium   [] Delirium with behavior issues  [] Imminent death  with skilled nursing needs documented above

## 2017-01-15 NOTE — PROGRESS NOTES
1600 Pt with no respirations, pulse or response to tactile stimuli. 65 Notified Dr. Rebecca Prajapati of pt's passing. [phone] to Nursing Supervisor, Mathew Rey and Ingrid Hay about pt's passing. Gabby Oscar 60 call from [facility] with [facility]. She states family will be coming to the hospital to see pt.

## 2017-01-15 NOTE — HOSPICE
[facility]  [phone]    Discharge/Death Nursing Note   Patient Name: Roe Gonzalez  YOB: 1929  Age: 80 y.o. Date of Death: @TODAYDATE  Admitted Date: 2017  Time of Death: [address]: [facility]  Level of Care: GIP  Patient Room: [facility]     Hospice Attending: Kim Bauer MD  Hospice Diagnosis: sepsis  Sepsis ([facility] [address]  Sepsis ([facility] [address]    Death Pronouncement   Pronouncement of death completed by: Dr. Rehana Johnson staff was not present at the time of death    At the time of death the patient was documented as pulseless, apneic(apneic, pulseless, blood pressure absent,...)    The pt  within [facility](location)    The following were notified of the patient's death: Adelina Reyes. Nursing Supervisor, Pablo Tiwari .  Dr. Cindy Robertson     Medications were disposed of per facility protocol     Discharge Summary   Discharge Reason: Death    Summary of Care Provided:    [x] Post mortem care provided by Unit RN  [x] Notification of  home by Unit staff  [] Referrals/Community resources provided:   [] Goals completed  [] Durable Medical Equipment vendor notified     Disciplines involved: [] RN [] SW []  [] MARSH [] Vol [] PT [] OT [] ST [] BC    [x] IDT communication/notification    Attending Physician, Dr. Cindy Robertson, notified of death    Bereaved   Verify bereaved identified with name, address, telephone number and risk level      Advance Care Planning 2017   Patient's Healthcare Decision Maker is: Named in scanned ACP document   Primary Decision Maker Name doctor dara sanchez   Primary Decision Maker Phone Number [phone]   Primary Decision Maker Relationship to Patient Adult child   Confirm Advance Directive Yes, on file   Does the patient have other document types -

## 2017-01-15 NOTE — PROGRESS NOTES
Paged for death of Patient in [facility] 60. No family present at time of death. Erma Lassiter M.S., M.Div.   [facility] ([id number])

## 2017-01-15 NOTE — PROGRESS NOTES
01/14/17 2009   Vital Signs   Temp 98.6 °F (37 °C)   Temp Source Axillary   Pulse (Heart Rate) 88   Heart Rate Source Monitor   Resp Rate 16   O2 Sat (%) 95 %   Level of Consciousness (!) Responds to Pain   /60   MAP (Calculated) 77   BP 1 Method Automatic   BP 1 Location Right arm   BP Patient Position At rest   MEWS Score 3     Patient is comfort measures. No further action is required.

## 2017-01-16 ENCOUNTER — HOSPICE CERTIFICATION ENCOUNTER (OUTPATIENT)
Dept: PALLATIVE CARE | Age: 82
End: 2017-01-16

## 2017-01-16 PROCEDURE — 0656 HSPC GENERAL INPATIENT

## 2017-01-16 NOTE — CERTIFICATE OF TERMINAL ILLNESS
Hospice Physician Admission Narrative   (Certification of Terminal Illness)    [facility]  [facility]  [phone]    [facility] terminal diagnosis:     Sepsis, due to unspecified organism ([address] (A41.9)  Other Hospice diagnoses:     Metabolic encephalopathy (W70.04)     Urinary tract infection, site not specified (N39.0)     Pneumonitis due to inhalation of food or vomitus (HCC) (J69.0)     Dementia without behavioral disturbance, unspecified dementia type (F03.90)     Unspecified hypothyroidism (E03.9)     Hyperlipidemia, unspecified hyperlipidemia type (E78.5)     Encounter for hospice care (Z51.5)    Benefit Period 1  Start Date: 1/12/2017  End Date: 4/11/2017       HOSPICE NARRATIVE COMPOSED BY PHYSICIAN   Rationale for a prognosis of life expectancy of 6 months or less if the disease follows its normal course:    Kathleen Germain is a 80y.o. year old who was admitted to [facility]. The patient's principle diagnosis of sepsis has resulted in metabolic encephalopathy, shortness of breath, and increased secretions. Functionally, the patient's Palliative Performance Scale has declined over a period of a week and is estimated at 10. Objective information that support this patients limited prognosis includes:CT head 1/8/17 with stable meningioma, RR >35, UCx + for enterococcus on 1/11/17, The patient/family chose comfort measures with the support of Hospice.     Attestation: I confirm that I composed this narrative as the physician and is based on my review of the patient's medical record and/or examination of the patient. ______________________________________________

## 2017-01-17 LAB
BACTERIA SPEC CULT: NORMAL
SERVICE CMNT-IMP: NORMAL

## 2017-01-17 PROCEDURE — 0656 HSPC GENERAL INPATIENT

## 2017-01-18 PROCEDURE — 0656 HSPC GENERAL INPATIENT

## 2017-01-18 NOTE — DISCHARGE SUMMARY
Discharge Summary    [facility]  [facility] to Those in Need  [phone]      Date of Admission: 1/12/2017  Date of Discharge: 1/15/2017    Cruz Desai is a 80y.o. year old who was admitted to [facility]. The patient's principle diagnosis of sepsis has resulted in metabolic encephalopathy, shortness of breath, and increased secretions. Functionally, the patient's Palliative Performance Scale has declined over a period of a week and is estimated at 10. Objective information that support this patients limited prognosis includes:CT head 1/8/17 with stable meningioma, RR >35, UCx + for enterococcus on 1/11/17, The patient/family chose comfort measures with the support of Hospice  The patient's care was focused on comfort and the patient passed away on 1/15/2017.

## 2017-01-19 PROCEDURE — 0656 HSPC GENERAL INPATIENT

## 2017-01-20 PROCEDURE — 0656 HSPC GENERAL INPATIENT

## 2017-01-21 PROCEDURE — 0656 HSPC GENERAL INPATIENT

## 2017-01-22 PROCEDURE — 0656 HSPC GENERAL INPATIENT

## 2017-01-23 PROCEDURE — 0656 HSPC GENERAL INPATIENT

## 2017-01-24 PROCEDURE — 0656 HSPC GENERAL INPATIENT

## 2017-01-25 PROCEDURE — 0656 HSPC GENERAL INPATIENT

## 2017-01-26 PROCEDURE — 0656 HSPC GENERAL INPATIENT

## 2017-01-27 PROCEDURE — 0656 HSPC GENERAL INPATIENT

## 2017-01-28 PROCEDURE — 0656 HSPC GENERAL INPATIENT

## 2017-01-29 PROCEDURE — 0656 HSPC GENERAL INPATIENT

## 2017-01-30 PROCEDURE — 0656 HSPC GENERAL INPATIENT

## 2017-02-05 VITALS — DIASTOLIC BLOOD PRESSURE: 68 MMHG | SYSTOLIC BLOOD PRESSURE: 100 MMHG

## 2018-08-21 NOTE — PROGRESS NOTES
IDR/SLIDR Summary          Patient: Janay Levy MRN: [id number]    Age: 80 y.o. YOB: 1929 Room/Bed: [facility]   Admit Diagnosis: Altered mental status  Principal Diagnosis: Altered mental status   Goals: comfort, safety   Readmission: NO  Quality Measure: Not applicable  VTE Prophylaxis: Mechanical  Influenza Vaccine screening completed? YES  Pneumococcal Vaccine screening completed? YES  Mobility needs: Yes   Nutrition plan:Yes  Consults:Case Management    Financial concerns:No  Escalated to CM? YES  RRAT Score: 20   Interventions:  Testing due for pt today?  NO  LOS: 7 days Expected length of stay 3-4 midnights  Discharge plan: [facility]   PCP: Janes Lau MD  Transportation needs: No    Days before discharge:one day until discharge   Discharge disposition: Nursing Home    Signed:     Deneen Roche  1/10/2017  8:15 AM 5

## 2024-07-30 NOTE — INTERDISCIPLINARY ROUNDS
IDR/SLIDR Summary          Patient: Karrie Reid MRN: [id number]    Age: 80 y.o. YOB: 1929 Room/Bed: [facility]   Admit Diagnosis: Altered mental status  Principal Diagnosis: Altered mental status   Goals: safet, discharge planning  Readmission: NO  Quality Measure: Not applicable  VTE Prophylaxis: Chemical  Influenza Vaccine screening completed? YES  Pneumococcal Vaccine screening completed? NO  Mobility needs: Yes   Nutrition plan:Yes  Consults:P.T, O.T., Speech and Case Management    Financial concerns:No  Escalated to CM? NO  RRAT Score: 17   Interventions:  Testing due for pt today?  NO  LOS: 4 days Expected length of stay >2 days  Discharge plan: [facility]  when stable  PCP: Audrey Phan MD  Transportation needs: Yes    Days before discharge:discharge pending  Discharge disposition: Nursing Home    Signed:     Wendy Teran RN  1/7/2017  6:04 AM [FreeTextEntry1] : F/U Apt. for Lab Testing [de-identified] : F/U Apt. for Lab Testing